# Patient Record
Sex: FEMALE | Race: WHITE | NOT HISPANIC OR LATINO | Employment: OTHER | URBAN - METROPOLITAN AREA
[De-identification: names, ages, dates, MRNs, and addresses within clinical notes are randomized per-mention and may not be internally consistent; named-entity substitution may affect disease eponyms.]

---

## 2021-06-19 ENCOUNTER — APPOINTMENT (OUTPATIENT)
Dept: GENERAL RADIOLOGY | Facility: HOSPITAL | Age: 86
End: 2021-06-19

## 2021-06-19 ENCOUNTER — HOSPITAL ENCOUNTER (INPATIENT)
Facility: HOSPITAL | Age: 86
LOS: 3 days | Discharge: HOME | End: 2021-06-22
Attending: INTERNAL MEDICINE | Admitting: INTERNAL MEDICINE

## 2021-06-19 DIAGNOSIS — I21.3 ST ELEVATION MYOCARDIAL INFARCTION (STEMI), UNSPECIFIED ARTERY (HCC): ICD-10-CM

## 2021-06-19 DIAGNOSIS — R06.00 DYSPNEA, UNSPECIFIED TYPE: Primary | ICD-10-CM

## 2021-06-19 LAB
ACT BLD: 142 SECONDS (ref 89–137)
ACT BLD: 153 SECONDS (ref 89–137)
ANION GAP SERPL CALCULATED.3IONS-SCNC: 14 MMOL/L (ref 5–15)
APTT PPP: 25.2 SECONDS (ref 24–31)
ARTERIAL PATENCY WRIST A: POSITIVE
ATMOSPHERIC PRESS: ABNORMAL MM[HG]
BASE EXCESS BLDA CALC-SCNC: -1.7 MMOL/L (ref 0–3)
BASOPHILS # BLD AUTO: 0 10*3/MM3 (ref 0–0.2)
BASOPHILS NFR BLD AUTO: 0.2 % (ref 0–1.5)
BDY SITE: ABNORMAL
BUN SERPL-MCNC: 14 MG/DL (ref 8–23)
BUN/CREAT SERPL: 15.2 (ref 7–25)
CALCIUM SPEC-SCNC: 9.1 MG/DL (ref 8.6–10.5)
CHLORIDE SERPL-SCNC: 99 MMOL/L (ref 98–107)
CK SERPL-CCNC: 169 U/L (ref 20–180)
CO2 BLDA-SCNC: 24 MMOL/L (ref 22–29)
CO2 SERPL-SCNC: 21 MMOL/L (ref 22–29)
CREAT SERPL-MCNC: 0.92 MG/DL (ref 0.57–1)
DEPRECATED RDW RBC AUTO: 43.8 FL (ref 37–54)
EOSINOPHIL # BLD AUTO: 0 10*3/MM3 (ref 0–0.4)
EOSINOPHIL NFR BLD AUTO: 0.1 % (ref 0.3–6.2)
ERYTHROCYTE [DISTWIDTH] IN BLOOD BY AUTOMATED COUNT: 13.4 % (ref 12.3–15.4)
GFR SERPL CREATININE-BSD FRML MDRD: 58 ML/MIN/1.73
GLUCOSE BLDC GLUCOMTR-MCNC: 142 MG/DL (ref 70–105)
GLUCOSE SERPL-MCNC: 181 MG/DL (ref 65–99)
HCO3 BLDA-SCNC: 22.8 MMOL/L (ref 21–28)
HCT VFR BLD AUTO: 40.3 % (ref 34–46.6)
HEMODILUTION: NO
HGB BLD-MCNC: 13.8 G/DL (ref 12–15.9)
HOLD SPECIMEN: NORMAL
INHALED O2 CONCENTRATION: <21 %
INR PPP: 1.07 (ref 0.93–1.1)
LYMPHOCYTES # BLD AUTO: 0.8 10*3/MM3 (ref 0.7–3.1)
LYMPHOCYTES NFR BLD AUTO: 6.3 % (ref 19.6–45.3)
MAGNESIUM SERPL-MCNC: 1.9 MG/DL (ref 1.6–2.4)
MCH RBC QN AUTO: 31.8 PG (ref 26.6–33)
MCHC RBC AUTO-ENTMCNC: 34.3 G/DL (ref 31.5–35.7)
MCV RBC AUTO: 92.8 FL (ref 79–97)
MODALITY: ABNORMAL
MONOCYTES # BLD AUTO: 1.7 10*3/MM3 (ref 0.1–0.9)
MONOCYTES NFR BLD AUTO: 13 % (ref 5–12)
NEUTROPHILS NFR BLD AUTO: 10.5 10*3/MM3 (ref 1.7–7)
NEUTROPHILS NFR BLD AUTO: 80.4 % (ref 42.7–76)
NRBC BLD AUTO-RTO: 0 /100 WBC (ref 0–0.2)
NT-PROBNP SERPL-MCNC: 6849 PG/ML (ref 0–1800)
PCO2 BLDA: 37.4 MM HG (ref 35–48)
PH BLDA: 7.39 PH UNITS (ref 7.35–7.45)
PLATELET # BLD AUTO: 263 10*3/MM3 (ref 140–450)
PMV BLD AUTO: 9.6 FL (ref 6–12)
PO2 BLDA: 58.7 MM HG (ref 83–108)
POTASSIUM SERPL-SCNC: 3.9 MMOL/L (ref 3.5–5.2)
PROTHROMBIN TIME: 11.8 SECONDS (ref 9.6–11.7)
RBC # BLD AUTO: 4.34 10*6/MM3 (ref 3.77–5.28)
SAO2 % BLDCOA: 90 % (ref 94–98)
SARS-COV-2 RNA PNL SPEC NAA+PROBE: NOT DETECTED
SODIUM SERPL-SCNC: 134 MMOL/L (ref 136–145)
TROPONIN T SERPL-MCNC: 0.19 NG/ML (ref 0–0.03)
WBC # BLD AUTO: 13.1 10*3/MM3 (ref 3.4–10.8)

## 2021-06-19 PROCEDURE — 25010000002 HEPARIN (PORCINE) PER 1000 UNITS

## 2021-06-19 PROCEDURE — 25010000002 ONDANSETRON PER 1 MG

## 2021-06-19 PROCEDURE — 93458 L HRT ARTERY/VENTRICLE ANGIO: CPT | Performed by: INTERNAL MEDICINE

## 2021-06-19 PROCEDURE — 80048 BASIC METABOLIC PNL TOTAL CA: CPT | Performed by: NURSE PRACTITIONER

## 2021-06-19 PROCEDURE — 84484 ASSAY OF TROPONIN QUANT: CPT | Performed by: NURSE PRACTITIONER

## 2021-06-19 PROCEDURE — 85610 PROTHROMBIN TIME: CPT | Performed by: NURSE PRACTITIONER

## 2021-06-19 PROCEDURE — C1894 INTRO/SHEATH, NON-LASER: HCPCS | Performed by: INTERNAL MEDICINE

## 2021-06-19 PROCEDURE — 85730 THROMBOPLASTIN TIME PARTIAL: CPT | Performed by: NURSE PRACTITIONER

## 2021-06-19 PROCEDURE — 25010000002 FENTANYL CITRATE (PF) 50 MCG/ML SOLUTION

## 2021-06-19 PROCEDURE — B2111ZZ FLUOROSCOPY OF MULTIPLE CORONARY ARTERIES USING LOW OSMOLAR CONTRAST: ICD-10-PCS | Performed by: INTERNAL MEDICINE

## 2021-06-19 PROCEDURE — 99285 EMERGENCY DEPT VISIT HI MDM: CPT

## 2021-06-19 PROCEDURE — 99152 MOD SED SAME PHYS/QHP 5/>YRS: CPT | Performed by: INTERNAL MEDICINE

## 2021-06-19 PROCEDURE — 85347 COAGULATION TIME ACTIVATED: CPT

## 2021-06-19 PROCEDURE — 25010000002 METOCLOPRAMIDE PER 10 MG: Performed by: NURSE PRACTITIONER

## 2021-06-19 PROCEDURE — 25010000002 HEPARIN (PORCINE) PER 1000 UNITS: Performed by: NURSE PRACTITIONER

## 2021-06-19 PROCEDURE — 82962 GLUCOSE BLOOD TEST: CPT

## 2021-06-19 PROCEDURE — 99223 1ST HOSP IP/OBS HIGH 75: CPT | Performed by: INTERNAL MEDICINE

## 2021-06-19 PROCEDURE — 83880 ASSAY OF NATRIURETIC PEPTIDE: CPT | Performed by: NURSE PRACTITIONER

## 2021-06-19 PROCEDURE — 82550 ASSAY OF CK (CPK): CPT | Performed by: NURSE PRACTITIONER

## 2021-06-19 PROCEDURE — 0 IOPAMIDOL PER 1 ML: Performed by: INTERNAL MEDICINE

## 2021-06-19 PROCEDURE — 85025 COMPLETE CBC W/AUTO DIFF WBC: CPT | Performed by: NURSE PRACTITIONER

## 2021-06-19 PROCEDURE — 4A023N7 MEASUREMENT OF CARDIAC SAMPLING AND PRESSURE, LEFT HEART, PERCUTANEOUS APPROACH: ICD-10-PCS | Performed by: INTERNAL MEDICINE

## 2021-06-19 PROCEDURE — 36600 WITHDRAWAL OF ARTERIAL BLOOD: CPT

## 2021-06-19 PROCEDURE — U0003 INFECTIOUS AGENT DETECTION BY NUCLEIC ACID (DNA OR RNA); SEVERE ACUTE RESPIRATORY SYNDROME CORONAVIRUS 2 (SARS-COV-2) (CORONAVIRUS DISEASE [COVID-19]), AMPLIFIED PROBE TECHNIQUE, MAKING USE OF HIGH THROUGHPUT TECHNOLOGIES AS DESCRIBED BY CMS-2020-01-R: HCPCS | Performed by: NURSE PRACTITIONER

## 2021-06-19 PROCEDURE — 25010000002 ONDANSETRON PER 1 MG: Performed by: NURSE PRACTITIONER

## 2021-06-19 PROCEDURE — 25010000002 FENTANYL CITRATE (PF) 50 MCG/ML SOLUTION: Performed by: NURSE PRACTITIONER

## 2021-06-19 PROCEDURE — 93005 ELECTROCARDIOGRAM TRACING: CPT

## 2021-06-19 PROCEDURE — 82803 BLOOD GASES ANY COMBINATION: CPT

## 2021-06-19 PROCEDURE — B2151ZZ FLUOROSCOPY OF LEFT HEART USING LOW OSMOLAR CONTRAST: ICD-10-PCS | Performed by: INTERNAL MEDICINE

## 2021-06-19 PROCEDURE — 25010000002 FENTANYL CITRATE (PF) 100 MCG/2ML SOLUTION: Performed by: INTERNAL MEDICINE

## 2021-06-19 PROCEDURE — 93005 ELECTROCARDIOGRAM TRACING: CPT | Performed by: NURSE PRACTITIONER

## 2021-06-19 PROCEDURE — 71045 X-RAY EXAM CHEST 1 VIEW: CPT

## 2021-06-19 PROCEDURE — 83735 ASSAY OF MAGNESIUM: CPT | Performed by: NURSE PRACTITIONER

## 2021-06-19 PROCEDURE — C1769 GUIDE WIRE: HCPCS | Performed by: INTERNAL MEDICINE

## 2021-06-19 RX ORDER — NITROGLYCERIN 20 MG/100ML
INJECTION INTRAVENOUS
Status: DISPENSED
Start: 2021-06-19 | End: 2021-06-19

## 2021-06-19 RX ORDER — METOCLOPRAMIDE HYDROCHLORIDE 5 MG/ML
5 INJECTION INTRAMUSCULAR; INTRAVENOUS ONCE
Status: COMPLETED | OUTPATIENT
Start: 2021-06-19 | End: 2021-06-19

## 2021-06-19 RX ORDER — NITROGLYCERIN 20 MG/100ML
INJECTION INTRAVENOUS
Status: COMPLETED | OUTPATIENT
Start: 2021-06-19 | End: 2021-06-19

## 2021-06-19 RX ORDER — MULTIPLE VITAMINS W/ MINERALS TAB 9MG-400MCG
1 TAB ORAL DAILY
COMMUNITY

## 2021-06-19 RX ORDER — FENTANYL CITRATE 50 UG/ML
INJECTION, SOLUTION INTRAMUSCULAR; INTRAVENOUS
Status: COMPLETED
Start: 2021-06-19 | End: 2021-06-19

## 2021-06-19 RX ORDER — SODIUM CHLORIDE 9 MG/ML
50 INJECTION, SOLUTION INTRAVENOUS CONTINUOUS
Status: DISCONTINUED | OUTPATIENT
Start: 2021-06-19 | End: 2021-06-20

## 2021-06-19 RX ORDER — SODIUM CHLORIDE 9 MG/ML
INJECTION, SOLUTION INTRAVENOUS CONTINUOUS PRN
Status: COMPLETED | OUTPATIENT
Start: 2021-06-19 | End: 2021-06-19

## 2021-06-19 RX ORDER — LIDOCAINE HYDROCHLORIDE 20 MG/ML
INJECTION, SOLUTION INFILTRATION; PERINEURAL AS NEEDED
Status: DISCONTINUED | OUTPATIENT
Start: 2021-06-19 | End: 2021-06-19 | Stop reason: HOSPADM

## 2021-06-19 RX ORDER — HEPARIN SODIUM 1000 [USP'U]/ML
INJECTION, SOLUTION INTRAVENOUS; SUBCUTANEOUS
Status: COMPLETED
Start: 2021-06-19 | End: 2021-06-19

## 2021-06-19 RX ORDER — SODIUM CHLORIDE 9 MG/ML
250 INJECTION, SOLUTION INTRAVENOUS ONCE AS NEEDED
Status: DISCONTINUED | OUTPATIENT
Start: 2021-06-19 | End: 2021-06-22 | Stop reason: HOSPADM

## 2021-06-19 RX ORDER — FENTANYL CITRATE 50 UG/ML
INJECTION, SOLUTION INTRAMUSCULAR; INTRAVENOUS AS NEEDED
Status: DISCONTINUED | OUTPATIENT
Start: 2021-06-19 | End: 2021-06-19 | Stop reason: HOSPADM

## 2021-06-19 RX ORDER — FENTANYL CITRATE 50 UG/ML
INJECTION, SOLUTION INTRAMUSCULAR; INTRAVENOUS
Status: COMPLETED | OUTPATIENT
Start: 2021-06-19 | End: 2021-06-19

## 2021-06-19 RX ORDER — SODIUM CHLORIDE 0.9 % (FLUSH) 0.9 %
10 SYRINGE (ML) INJECTION AS NEEDED
Status: DISCONTINUED | OUTPATIENT
Start: 2021-06-19 | End: 2021-06-22 | Stop reason: HOSPADM

## 2021-06-19 RX ORDER — EPTIFIBATIDE 0.75 MG/ML
INJECTION, SOLUTION INTRAVENOUS
Status: DISCONTINUED
Start: 2021-06-19 | End: 2021-06-19 | Stop reason: WASHOUT

## 2021-06-19 RX ORDER — LISINOPRIL 40 MG/1
40 TABLET ORAL DAILY
COMMUNITY
End: 2022-12-02 | Stop reason: SDUPTHER

## 2021-06-19 RX ORDER — ASPIRIN 81 MG/1
TABLET, CHEWABLE ORAL
Status: COMPLETED
Start: 2021-06-19 | End: 2021-06-19

## 2021-06-19 RX ORDER — ASPIRIN 81 MG/1
81 TABLET, CHEWABLE ORAL DAILY
Status: DISCONTINUED | OUTPATIENT
Start: 2021-06-19 | End: 2021-06-22 | Stop reason: HOSPADM

## 2021-06-19 RX ORDER — ASPIRIN 81 MG/1
81 TABLET, CHEWABLE ORAL DAILY
COMMUNITY

## 2021-06-19 RX ORDER — ASPIRIN 81 MG/1
TABLET, CHEWABLE ORAL
Status: COMPLETED | OUTPATIENT
Start: 2021-06-19 | End: 2021-06-19

## 2021-06-19 RX ORDER — ACETAMINOPHEN 325 MG/1
650 TABLET ORAL EVERY 4 HOURS PRN
Status: DISCONTINUED | OUTPATIENT
Start: 2021-06-19 | End: 2021-06-22 | Stop reason: HOSPADM

## 2021-06-19 RX ORDER — AMLODIPINE BESYLATE 2.5 MG/1
2.5 TABLET ORAL DAILY
COMMUNITY
End: 2021-06-19

## 2021-06-19 RX ORDER — OXYBUTYNIN CHLORIDE 5 MG/1
5 TABLET ORAL 2 TIMES DAILY
COMMUNITY

## 2021-06-19 RX ORDER — ONDANSETRON 2 MG/ML
INJECTION INTRAMUSCULAR; INTRAVENOUS
Status: COMPLETED | OUTPATIENT
Start: 2021-06-19 | End: 2021-06-19

## 2021-06-19 RX ORDER — ONDANSETRON 2 MG/ML
INJECTION INTRAMUSCULAR; INTRAVENOUS
Status: COMPLETED
Start: 2021-06-19 | End: 2021-06-19

## 2021-06-19 RX ORDER — HEPARIN SODIUM 5000 [USP'U]/ML
INJECTION, SOLUTION INTRAVENOUS; SUBCUTANEOUS
Status: COMPLETED | OUTPATIENT
Start: 2021-06-19 | End: 2021-06-19

## 2021-06-19 RX ORDER — LISINOPRIL 20 MG/1
40 TABLET ORAL DAILY
Status: DISCONTINUED | OUTPATIENT
Start: 2021-06-19 | End: 2021-06-22 | Stop reason: HOSPADM

## 2021-06-19 RX ADMIN — METOPROLOL TARTRATE 25 MG: 25 TABLET, FILM COATED ORAL at 15:22

## 2021-06-19 RX ADMIN — ASPIRIN: 81 TABLET, CHEWABLE ORAL at 12:23

## 2021-06-19 RX ADMIN — FENTANYL CITRATE: 50 INJECTION, SOLUTION INTRAMUSCULAR; INTRAVENOUS at 12:24

## 2021-06-19 RX ADMIN — ASPIRIN 324 MG: 81 TABLET, CHEWABLE ORAL at 10:49

## 2021-06-19 RX ADMIN — SODIUM CHLORIDE 75 ML/HR: 9 INJECTION, SOLUTION INTRAVENOUS at 12:35

## 2021-06-19 RX ADMIN — HEPARIN SODIUM 4000 UNITS: 5000 INJECTION INTRAVENOUS; SUBCUTANEOUS at 10:47

## 2021-06-19 RX ADMIN — NITROGLYCERIN 10 MCG/MIN: 20 INJECTION INTRAVENOUS at 10:46

## 2021-06-19 RX ADMIN — Medication 10 ML: at 20:41

## 2021-06-19 RX ADMIN — FENTANYL CITRATE 50 MCG: 50 INJECTION, SOLUTION INTRAMUSCULAR; INTRAVENOUS at 10:48

## 2021-06-19 RX ADMIN — METOPROLOL TARTRATE 12.5 MG: 25 TABLET, FILM COATED ORAL at 20:40

## 2021-06-19 RX ADMIN — ONDANSETRON: 2 INJECTION INTRAMUSCULAR; INTRAVENOUS at 12:25

## 2021-06-19 RX ADMIN — ONDANSETRON 4 MG: 2 INJECTION INTRAMUSCULAR; INTRAVENOUS at 10:48

## 2021-06-19 RX ADMIN — HEPARIN SODIUM: 1000 INJECTION INTRAVENOUS; SUBCUTANEOUS at 12:24

## 2021-06-19 RX ADMIN — METOCLOPRAMIDE 5 MG: 5 INJECTION, SOLUTION INTRAMUSCULAR; INTRAVENOUS at 20:41

## 2021-06-19 NOTE — NURSING NOTE
Pt arrived from cath lab via stretcher and RN transport x 2 Bedside report from Jacquelin URBINA. VSS, no gtts infusing, right femoral sheath in place and being transduced. Pt alert and oriented without complaints of chest pain but does feel slightly SOB which is much improved. Family updated.

## 2021-06-19 NOTE — H&P
PULMONARY/CRITICAL CARE HISTORY & PHYSICAL       PATIENT NAME:     Suzette Blandon  :     1934    MRN:     [id number]       ROOM:     [facility] /     PRIMARY CARE PHYSICIAN:  Bartolo Meng    SUBJECTIVE     CHIEF COMPLAINT: Shortness of air      HISTORY OF PRESENT ILLNESS:  The patient is an 86-year-old female who presented to the emergency department today for evaluation of shortness of air with progressive worsening over the past 6 days.  She reports that the shortness of air worsens with exertion and noted that it became increasingly worse when she walked in the heat.  She reports that her shortness of air is worse with exertion and somewhat relieved at rest.  She denies that she has had fever, chills, chest pain, peripheral edema, nausea, vomiting or abdominal pain.  She does report that she had episodic diarrhea approximately 6 days ago which spontaneously resolved.  She denies that she has had a cough or expectoration.  On evaluation in the emergency department room air saturation was noted to be 87% which responded appropriately with 2 L O2 to saturation 94%.  EKG revealed changes consistent with STEMI and cardiology was consulted.  The patient was taken urgently to the cardiac catheterization lab where she was noted to have nonobstructive coronary artery disease in the diagonal branch with no other abnormalities however she did have severe LV dysfunction consistent with Takotsubo cardiomyopathy.  The patient was placed on low-dose beta-blockers and ACE inhibitor and admitted to CVICU post procedure.    Pulmonary/Intensivist service was contacted for admission to ICU and further evaluation and treatment.      REVIEW OF SYSTEMS:  As above      ASSESSMENT & PLAN   Acute coronary syndrome  Takotsubo cardiomyopathy  Hypertension  Essential hyperlipidemia  GERD    Plan  O2 as needed to maintain a saturation of 92%, currently on 4 L  Continue aspirin  Continue lisinopril and  "beta-blocker    Echocardiogram pending  Bilateral lower extremity venous Dopplers pending      Code Status: Full code  VTE Prophylaxis: SCDs, early mobilization  PUD Prophylaxis: Not indicated, tolerating p.o. intake      HOSPITAL MEDICATIONS     SCHEDULED MEDICATIONS:  aspirin, 81 mg, Oral, Daily  lisinopril, 40 mg, Oral, Daily  metoprolol tartrate, 25 mg, Oral, Q12H  nitroglycerin, , ,          CONTINUOUS INFUSIONS:    sodium chloride, 75 mL/hr, Last Rate: 75 mL/hr (06/19/21 1235)         PRN MEDICATIONS:   •  acetaminophen  •  atropine  •  [COMPLETED] Insert peripheral IV **AND** sodium chloride  •  sodium chloride       OBJECTIVE     VITAL SIGNS:  /78   Pulse 72   Temp 98.2 °F (36.8 °C) (Oral)   Resp 18   Ht 162.6 cm (64\")   Wt 104 kg (229 lb 11.5 oz)   SpO2 93%   BMI 39.43 kg/m²     Wt Readings from Last 3 Encounters:   06/19/21 104 kg (229 lb 11.5 oz)       INTAKE/OUTPUT:  No intake or output data in the 24 hours ending 06/19/21 8859    PHYSICAL EXAM:   Constitutional:  Well developed, well nourished, no acute distress, non-toxic appearance   Eyes:  PERRL, conjunctiva normal, EOMI   HENT:  Atraumatic, external ears normal, nose normal. Neck-normal range of motion, no JVD, supple, trachea midline  Respiratory: clear and equal , non-labored respirations without accessory muscle use  Cardiovascular:  Normal rate, normal rhythm, no murmurs, no gallops, no rubs   GI:  Soft, nondistended, normal bowel sounds, nontender, no rebound, no guarding   :  deferred   Musculoskeletal:  No edema, no cyanosis or clubbing, no deformities  Integument:  Well hydrated, no rash   Neurologic:  Alert & oriented x 3, no lateralizing deficits  Psychiatric:  Speech and behavior appropriate       HISTORY     HISTORY:  Past Medical History:   Diagnosis Date   • Arthritis    • Coronary artery disease    • Elevated cholesterol    • GERD (gastroesophageal reflux disease)    • Hypertension      Past Surgical History: "   Procedure Laterality Date   • ABDOMINAL SURGERY     • CARDIAC CATHETERIZATION     • EYE SURGERY      CATARACT SURGERY   • HYSTERECTOMY     • JOINT REPLACEMENT      RIGHT HIP     Family History   Problem Relation Age of Onset   • Heart disease Mother    • COPD Father    • Heart disease Father    • Hypertension Father      Social History     Socioeconomic History   • Marital status:      Spouse name: Not on file   • Number of children: Not on file   • Years of education: Not on file   • Highest education level: Not on file   Tobacco Use   • Smoking status: Former Smoker   • Smokeless tobacco: Never Used   Vaping Use   • Vaping Use: Never used   Substance and Sexual Activity   • Alcohol use: Never   • Drug use: Never   • Sexual activity: Never        HOME MEDICATIONS:   Prior to Admission medications    Medication Sig Start Date End Date Taking? Authorizing Provider   aspirin 81 MG chewable tablet Chew 81 mg Daily.   Yes Joaquim Mcguire MD   lisinopril (PRINIVIL,ZESTRIL) 40 MG tablet Take 40 mg by mouth Daily.   Yes Joaquim Mcguire MD   Misc Natural Products (Osteo Bi-Flex Joint Shield) tablet Take 1 tablet by mouth 2 (two) times a day. Osteo Bi-Flex with Turmeric   Yes Joaquim Mcguire MD   multivitamin with minerals (Centrum Adults) tablet tablet Take 1 tablet by mouth Daily.   Yes Joaquim Mcguire MD   oxybutynin (DITROPAN) 5 MG tablet Take 5 mg by mouth 2 (Two) Times a Day.   Yes Joaquim Mcguire MD   amLODIPine (NORVASC) 2.5 MG tablet Take 2.5 mg by mouth Daily.  6/19/21 Yes Joaquim Mcguire MD       IMMUNIZATIONS:    There is no immunization history on file for this patient.     ALLERGIES:  Oxycodone, Penicillins, and Polyoxyethylene lauryl ether [sorbitan]      RESULTS     LABS:  Lab Results (last 24 hours)     Procedure Component Value Units Date/Time    COVID PRE-OP / PRE-PROCEDURE SCREENING ORDER (NO ISOLATION) - Swab, Nasopharynx [[id number]]  (Normal) Collected:  06/19/21 1036    Specimen: Swab from Nasopharynx Updated: 06/19/21 1154    Narrative:      The following orders were created for panel order COVID PRE-OP / PRE-PROCEDURE SCREENING ORDER (NO ISOLATION) - Swab, Nasopharynx.  Procedure                               Abnormality         Status                     ---------                               -----------         ------                     COVID-19,CEPHEID,COR/JEANE...[[id number]]  Normal              Final result                 Please view results for these tests on the individual orders.    COVID-19,CEPHEID,COR/JEANE/PAD/ZIA IN-HOUSE(OR EMERGENT/ADD-ON),NP SWAB IN TRANSPORT MEDIA 3-4 HR TAT, RT-PCR - Swab, Nasopharynx [[id number]]  (Normal) Collected: 06/19/21 1036    Specimen: Swab from Nasopharynx Updated: 06/19/21 1154     COVID19 Not Detected    Narrative:      Fact sheet for providers: https://www.fda.gov/media/102445/download     Fact sheet for patients: https://www.fda.gov/media/648381/download  Fact sheet for providers: https://www.fda.gov/media/494889/download     Fact sheet for patients: https://www.fda.gov/media/739364/download    CBC & Differential [[id number]]  (Abnormal) Collected: 06/19/21 1053    Specimen: Blood Updated: 06/19/21 1056    Narrative:      The following orders were created for panel order CBC & Differential.  Procedure                               Abnormality         Status                     ---------                               -----------         ------                     CBC Auto Differential[[id number]]        Abnormal            Final result                 Please view results for these tests on the individual orders.    Basic Metabolic Panel [[id number]]  (Abnormal) Collected: 06/19/21 1053    Specimen: Blood Updated: 06/19/21 1118     Glucose 181 mg/dL      BUN 14 mg/dL      Creatinine 0.92 mg/dL      Sodium 134 mmol/L      Potassium 3.9 mmol/L      Chloride 99 mmol/L      CO2 21.0 mmol/L      Calcium 9.1 mg/dL      eGFR Non   Amer 58 mL/min/1.73      BUN/Creatinine Ratio 15.2     Anion Gap 14.0 mmol/L     Narrative:      GFR Normal >60  Chronic Kidney Disease <60  Kidney Failure <15      Protime-INR [[id number]]  (Abnormal) Collected: 06/19/21 1053    Specimen: Blood Updated: 06/19/21 1106     Protime 11.8 Seconds      INR 1.07    aPTT [[id number]]  (Normal) Collected: 06/19/21 1053    Specimen: Blood Updated: 06/19/21 1106     PTT 25.2 seconds     Troponin [[id number]]  (Abnormal) Collected: 06/19/21 1053    Specimen: Blood Updated: 06/19/21 1120     Troponin T 0.188 ng/mL     Narrative:      Troponin T Reference Range:  <= 0.03 ng/mL-   Negative for AMI  >0.03 ng/mL-     Abnormal for myocardial necrosis.  Clinicians would have to utilize clinical acumen, EKG, Troponin and serial changes to determine if it is an Acute Myocardial Infarction or myocardial injury due to an underlying chronic condition.       Results may be falsely decreased if patient taking Biotin.      BNP [[id number]]  (Abnormal) Collected: 06/19/21 1053    Specimen: Blood Updated: 06/19/21 1116     proBNP 6,849.0 pg/mL     Narrative:      Among patients with dyspnea, NT-proBNP is highly sensitive for the detection of acute congestive heart failure. In addition NT-proBNP of <300 pg/ml effectively rules out acute congestive heart failure with 99% negative predictive value.    Results may be falsely decreased if patient taking Biotin.      CBC Auto Differential [[id number]]  (Abnormal) Collected: 06/19/21 1053    Specimen: Blood Updated: 06/19/21 1056     WBC 13.10 10*3/mm3      RBC 4.34 10*6/mm3      Hemoglobin 13.8 g/dL      Hematocrit 40.3 %      MCV 92.8 fL      MCH 31.8 pg      MCHC 34.3 g/dL      RDW 13.4 %      RDW-SD 43.8 fl      MPV 9.6 fL      Platelets 263 10*3/mm3      Neutrophil % 80.4 %      Lymphocyte % 6.3 %      Monocyte % 13.0 %      Eosinophil % 0.1 %      Basophil % 0.2 %      Neutrophils, Absolute 10.50 10*3/mm3      Lymphocytes, Absolute 0.80  10*3/mm3      Monocytes, Absolute 1.70 10*3/mm3      Eosinophils, Absolute 0.00 10*3/mm3      Basophils, Absolute 0.00 10*3/mm3      nRBC 0.0 /100 WBC     Magnesium [[id number]]  (Normal) Collected: 06/19/21 1053    Specimen: Blood Updated: 06/19/21 1118     Magnesium 1.9 mg/dL     CK [[id number]]  (Normal) Collected: 06/19/21 1053    Specimen: Blood Updated: 06/19/21 1118     Creatine Kinase 169 U/L     Blood Gas, Arterial - [[id number]]  (Abnormal) Collected: 06/19/21 1055    Specimen: Arterial Blood Updated: 06/19/21 1059     Site Right Radial     Neri's Test Positive     pH, Arterial 7.394 pH units      pCO2, Arterial 37.4 mm Hg      pO2, Arterial 58.7 mm Hg      HCO3, Arterial 22.8 mmol/L      Base Excess, Arterial -1.7 mmol/L      Comment: Serial Number: [id number]:  [id number]        O2 Saturation, Arterial 90.0 %      CO2 Content 24.0 mmol/L      Barometric Pressure for Blood Gas --     Comment: N/A        Modality Cannula     FIO2 <21 %      Hemodilution No    POC Activated Clotting Time [[id number]]  (Abnormal) Collected: 06/19/21 1143    Specimen: Arterial Blood Updated: 06/19/21 1312     Activated Clotting Time  153 Seconds      Comment: Serial Number: [id number]:  [id number]       POC Glucose Once [[id number]]  (Abnormal) Collected: 06/19/21 1222    Specimen: Blood Updated: 06/19/21 1223     Glucose 142 mg/dL      Comment: Serial Number: [id number]:  [id number]       POC Activated Clotting Time [[id number]]  (Abnormal) Collected: 06/19/21 1236    Specimen: Arterial Blood Updated: 06/19/21 1241     Activated Clotting Time  142 Seconds      Comment: Serial Number: [id number]:  [id number]               MICRO:  Microbiology Results (last 10 days)     Procedure Component Value - Date/Time    COVID PRE-OP / PRE-PROCEDURE SCREENING ORDER (NO ISOLATION) - Swab, Nasopharynx [[id number]]  (Normal) Collected: 06/19/21 1036    Lab Status: Final result Specimen: Swab from Nasopharynx Updated: 06/19/21 1154     Narrative:      The following orders were created for panel order COVID PRE-OP / PRE-PROCEDURE SCREENING ORDER (NO ISOLATION) - Swab, Nasopharynx.  Procedure                               Abnormality         Status                     ---------                               -----------         ------                     COVID-19,CEPHEID,COR/JEANE...[[id number]]  Normal              Final result                 Please view results for these tests on the individual orders.    COVID-19,CEPHEID,COR/JEANE/PAD/ZIA IN-HOUSE(OR EMERGENT/ADD-ON),NP SWAB IN TRANSPORT MEDIA 3-4 HR TAT, RT-PCR - Swab, Nasopharynx [[id number]]  (Normal) Collected: 06/19/21 1036    Lab Status: Final result Specimen: Swab from Nasopharynx Updated: 06/19/21 1154     COVID19 Not Detected    Narrative:      Fact sheet for providers: https://www.fda.gov/media/578520/download     Fact sheet for patients: https://www.fda.gov/media/728772/download  Fact sheet for providers: https://www.fda.gov/media/997014/download     Fact sheet for patients: https://www.fda.gov/media/180511/download            RADIOLOGY STUDIES:  Imaging Results (Last 72 Hours)     ** No results found for the last 72 hours. **                  ECHOCARDIOGRAM:         I reviewed the patient's new clinical results.        Appropriate PPE worn during assessment of patient per established guidelines.      Electronically signed by SANDRA Gamboa, 06/19/21, 3:49 PM EDT.

## 2021-06-19 NOTE — ED PROVIDER NOTES
Subjective   Chief complaint: soa      Context: Patient is an 86-year-old female who comes in ambulatory by private vehicle with family with complaints of shortness of breath that has been increasing for the last 6 days.  She states she had exerted herself in the heat 6 days ago when her car would not start and she had to walk.  She states she did have some diarrhea 6 days ago but none since then.  She denies any productive cough or fever.  She has not been Covid vaccinated but denies any recent travel or ill contacts.  She has never had a heart cath or stress test and does not currently see a cardiologist or lung doctor.  She has no history of sleep apnea.  She denies any IVDA or smoking. no History of CHF- she reports her only history is hypertension and she has been compliant with her lisinopril and Norvasc.  No swelling to her legs or feet recent trauma surgery immobilization prior history of DVT PE or exogenous hormone use.      Duration: 6 days, worse today    Timing: Worse with exertion    Severity: Mild to moderate      Associated symptoms:worse with exertion          PCP:           Review of Systems   Constitutional: Negative for fever.   HENT: Negative.    Eyes: Negative for visual disturbance.   Respiratory: Positive for shortness of breath.    Cardiovascular: Negative for palpitations and leg swelling.   Gastrointestinal: Positive for diarrhea and nausea.   Genitourinary: Negative.    Musculoskeletal: Negative.    Skin: Negative.    Allergic/Immunologic: Negative for immunocompromised state.   Neurological: Negative.    Hematological: Does not bruise/bleed easily.   Psychiatric/Behavioral: Negative for confusion.       Past Medical History:   Diagnosis Date   • Hypertension        No Known Allergies    No past surgical history on file.    No family history on file.             Objective   Physical Exam     Vital signs and triage nurse note reviewed.   Constitutional: Awake, alert; obese.  Family at  bedside  HEENT: Normocephalic, atraumatic; pupils are PERRL with intact EOM; oropharynx is pink and moist without exudate or erythema.   Neck: Supple, full range of motion without pain;    Cardiovascular: tachycardic Regular rate and rhythm, normal S1-S2.   Pulmonary: Respiratory effort regular nonlabored, breath sounds diminished BLL   Abdomen: Soft, nontender nondistended with normoactive bowel sounds; no rebound or guarding.   Musculoskeletal: Independent range of motion of all extremities with no palpable tenderness or edema.   Neuro: Alert oriented x3, speech is clear and appropriate, GCS 15   Skin:  Fleshtone warm, dry, intact; no erythematous or petechial rash or lesion       ECG 12 Lead      Date/Time: 6/19/2021 10:37 AM  Performed by: Shani Galicia APRN  Authorized by: Shani Galicia APRN   Interpreted by physician (karissa)  Previous ECG: no previous ECG available  Rhythm: sinus tachycardia  BPM: 104  Conduction: 1st degree  Clinical impression: myocardial infarction                 ED Course  ED Course as of Jun 19 1102   Sat Jun 19, 2021   1045 Dr maldonado at bedside      [JW]      ED Course User Index  [JW] Shani Galicia APRN           Labs Reviewed   CBC WITH AUTO DIFFERENTIAL - Abnormal; Notable for the following components:       Result Value    WBC 13.10 (*)     Neutrophil % 80.4 (*)     Lymphocyte % 6.3 (*)     Monocyte % 13.0 (*)     Eosinophil % 0.1 (*)     Neutrophils, Absolute 10.50 (*)     Monocytes, Absolute 1.70 (*)     All other components within normal limits   BLOOD GAS, ARTERIAL - Abnormal; Notable for the following components:    pO2, Arterial 58.7 (*)     Base Excess, Arterial -1.7 (*)     O2 Saturation, Arterial 90.0 (*)     All other components within normal limits   COVID PRE-OP / PRE-PROCEDURE SCREENING ORDER (NO ISOLATION)    Narrative:     The following orders were created for panel order COVID PRE-OP / PRE-PROCEDURE SCREENING ORDER (NO ISOLATION) - Swab,  Nasopharynx.  Procedure                               Abnormality         Status                     ---------                               -----------         ------                     COVID-19,CEPHEID,COR/JEANE...[[id number]]                      In process                   Please view results for these tests on the individual orders.   COVID-19,CEPHEID,COR/JEANE/PAD/ZIA IN-HOUSE(OR EMERGENT/ADD-ON),NP SWAB IN TRANSPORT MEDIA 3-4 HR TAT, RT-PCR   BLOOD GAS, ARTERIAL   BASIC METABOLIC PANEL   PROTIME-INR   APTT   TROPONIN (IN-HOUSE)   BNP (IN-HOUSE)   MAGNESIUM   CK   CBC AND DIFFERENTIAL    Narrative:     The following orders were created for panel order CBC & Differential.  Procedure                               Abnormality         Status                     ---------                               -----------         ------                     CBC Auto Differential[[id number]]        Abnormal            Final result                 Please view results for these tests on the individual orders.   EXTRA TUBES    Narrative:     The following orders were created for panel order Extra Tubes.  Procedure                               Abnormality         Status                     ---------                               -----------         ------                     Gold Top - SST[[id number]]                                   In process                   Please view results for these tests on the individual orders.   GOLD TOP - SST     Medications   nitroglycerin (TRIDIL) 200-5 MCG/ML-% infusion  - ADS Override Pull (has no administration in time range)   fentaNYL citrate (PF) (SUBLIMAZE) 50 mcg/mL injection  - ADS Override Pull (has no administration in time range)   heparin (porcine) 1000 UNIT/ML injection  - ADS Override Pull (has no administration in time range)   aspirin 81 MG chewable tablet  - ADS Override Pull (has no administration in time range)   ondansetron (ZOFRAN) 4 MG/2ML injection  - ADS Override Pull (has no  administration in time range)   sodium chloride 0.9 % flush 10 mL (has no administration in time range)   nitroglycerin (TRIDIL) 200 mcg/ml infusion (10 mcg/min Intravenous New Bag 6/19/21 1046)   heparin (porcine) 5000 UNIT/ML injection (4,000 Units Intravenous Given 6/19/21 1047)   fentaNYL citrate (PF) (SUBLIMAZE) injection (50 mcg Intravenous Given 6/19/21 1048)   ondansetron (ZOFRAN) injection (4 mg Intravenous Given 6/19/21 1048)   aspirin chewable tablet (324 mg Oral Given 6/19/21 1049)     No radiology results for the last day  Prior to Admission medications    Not on File                                     MDM  Number of Diagnoses or Management Options  Dyspnea, unspecified type  ST elevation myocardial infarction (STEMI), unspecified artery (CMS/HCC)  Diagnosis management comments: Chart review: no prior ekg for comparison      Comorbidities:  has a past medical history of Hypertension.  Differentials: stemi nonstemi chf heat exhaustion  chf cardiomyopathy infection not all inclusive of differentials considered  Discussion with provider: joel  Radiology interpretation:  pending on admission  Lab interpretation: pending on admission    Appropriate PPE worn during exam.  Patient's room air oxygenation saturation noted to be 87 was placed on 2 L nasal cannula and immediately improved to 94%.  Was initially entered wrong in registration triage and no initial EKG comparison was available, as registration was fixed there remains no comparison EKG.  Discussed with Dr. Funez and Dr. Pace. stemi orders placed.   On reexam patient's blood pressure has improved to 146/77 and HR 87 and she remains in stable condition    i discussed findings with patient and family who voices understanding of admission for cath lab with possible intervention             Final diagnoses:   Dyspnea, unspecified type   ST elevation myocardial infarction (STEMI), unspecified artery (CMS/HCC)       ED Disposition  ED Disposition     ED  Disposition Condition Comment    Decision to Admit            No follow-up provider specified.       Medication List      No changes were made to your prescriptions during this visit.          Shani Galicia, APRN  06/19/21 6548

## 2021-06-19 NOTE — PLAN OF CARE
Problem: Adult Inpatient Plan of Care  Goal: Plan of Care Review  Outcome: Ongoing, Progressing  Goal: Patient-Specific Goal (Individualized)  Outcome: Ongoing, Progressing  Goal: Absence of Hospital-Acquired Illness or Injury  Outcome: Ongoing, Progressing  Intervention: Identify and Manage Fall Risk  Recent Flowsheet Documentation  Taken 6/19/2021 1230 by Loly Salmno RN  Safety Promotion/Fall Prevention:   safety round/check completed   fall prevention program maintained   gait belt  Intervention: Prevent Skin Injury  Recent Flowsheet Documentation  Taken 6/19/2021 1230 by Loly Salmon RN  Skin Protection:   pulse oximeter probe site changed   silicone foam dressing in place   incontinence pads utilized   skin-to-skin areas padded   skin-to-device areas padded  Goal: Optimal Comfort and Wellbeing  Outcome: Ongoing, Progressing  Intervention: Provide Person-Centered Care  Recent Flowsheet Documentation  Taken 6/19/2021 1230 by Loly Salmon RN  Trust Relationship/Rapport:   care explained   emotional support provided   choices provided  Goal: Readiness for Transition of Care  Outcome: Ongoing, Progressing  Intervention: Mutually Develop Transition Plan  Recent Flowsheet Documentation  Taken 6/19/2021 1231 by Loly Salmon RN  Transportation Anticipated: car, drives self  Patient/Family Anticipated Services at Transition: none  Patient/Family Anticipates Transition to: home with family  Taken 6/19/2021 1229 by Loly Salmon RN  Equipment Currently Used at Home:   none   cane, straight   walker, rolling     Problem: Fall Injury Risk  Goal: Absence of Fall and Fall-Related Injury  Outcome: Ongoing, Progressing  Intervention: Identify and Manage Contributors to Fall Injury Risk  Recent Flowsheet Documentation  Taken 6/19/2021 1230 by Loly Salmon RN  Medication Review/Management: medications reviewed  Intervention: Promote Injury-Free Environment  Recent Flowsheet Documentation  Taken 6/19/2021  1230 by Loly Salmon RN  Safety Promotion/Fall Prevention:   safety round/check completed   fall prevention program maintained   gait belt     Problem: Chest Pain  Goal: Resolution of Chest Pain Symptoms  Outcome: Ongoing, Progressing     Problem: Adjustment to Illness (Acute Coronary Syndrome)  Goal: Optimal Adaptation to Illness  Outcome: Ongoing, Progressing  Intervention: Support Adjustment to Life-Changing Event  Recent Flowsheet Documentation  Taken 6/19/2021 1230 by Loly Salmon RN  Supportive Measures:   active listening utilized   self-care encouraged     Problem: Arrhythmia/Dysrhythmia (Acute Coronary Syndrome)  Goal: Normalized Cardiac Rhythm  Outcome: Ongoing, Progressing     Problem: Cardiac-Related Pain (Acute Coronary Syndrome)  Goal: Absence of Cardiac-Related Pain  Outcome: Ongoing, Progressing     Problem: Hemodynamic Instability (Acute Coronary Syndrome)  Goal: Effective Cardiac Pump Function  Outcome: Ongoing, Progressing  Intervention: Optimize Cardiac Function and Blood Flow  Recent Flowsheet Documentation  Taken 6/19/2021 1230 by Loly Salmon RN  Fluid/Electrolyte Management: fluids adjusted     Problem: Tissue Perfusion (Acute Coronary Syndrome)  Goal: Adequate Tissue Perfusion  Outcome: Ongoing, Progressing  Intervention: Optimize Cardiac Tissue Perfusion  Recent Flowsheet Documentation  Taken 6/19/2021 1230 by Loly Salmon RN  Activity Management: (6 hour bedrest post sheath pull) bedrest  Airway/Ventilation Management:   airway patency maintained   pulmonary hygiene promoted  Environmental Support: calm environment promoted   Goal Outcome Evaluation:   Pt arrived to unit post heart-cath after arriving to ER with diagnosis of STEMI. Femoral sheath pulled at bedside with 20 minutes pressure held, no hematoma, no loss of pulse or signs of distress after or during sheath pull. Head of bed elevated at 2 pm. Bedrest until 730. VSS, continue to observe

## 2021-06-19 NOTE — CONSULTS
Referring Provider: Intensivist  Reason for Consultation: Acute coronary syndrome    Patient Care Team:  Bartolo Meng as PCP - General (Family Medicine)    Chief complaint chest pain and shortness of breath    Subjective .     History of present illness:  Suzette Blandon is a 86 y.o. female with history of hypertension hyperlipidemia presented to the hospital complains of chest pain and shortness of breath for the last few days.  Patient states that she is under a lot of stress with her 's health and has been here to see her while she had the symptoms.  Chest pain is like heaviness in the middle of the chest pressure with shortness of breath.  No complaints of any PND orthopnea.  No palpitation dizziness syncope.  No swelling of the feet.  Has been taking her medicines regularly for her blood pressure.  She does not smoke.  She is quite active.  In the ER patient was noted to have ST segment elevation in anterolateral leads and hence she was taken to cardiac catheterization laboratory.  Patient was placed on aspirin heparin in the ER.    Review of Systems   Constitutional: Negative for fever and malaise/fatigue.   HENT: Negative for ear pain and nosebleeds.    Eyes: Negative for blurred vision and double vision.   Cardiovascular: Positive for chest pain. Negative for dyspnea on exertion and palpitations.   Respiratory: Positive for shortness of breath. Negative for cough.    Skin: Negative for rash.   Musculoskeletal: Negative for joint pain.   Gastrointestinal: Negative for abdominal pain, nausea and vomiting.   Neurological: Negative for focal weakness and headaches.   Psychiatric/Behavioral: Negative for depression. The patient is not nervous/anxious.    All other systems reviewed and are negative.      History  Past Medical History:   Diagnosis Date   • Hypertension        History reviewed. No pertinent surgical history.    History reviewed. No pertinent family history.    Social History     Tobacco Use  "  • Smoking status: Former Smoker   • Smokeless tobacco: Never Used   Substance Use Topics   • Alcohol use: Never   • Drug use: Never        Medications Prior to Admission   Medication Sig Dispense Refill Last Dose   • aspirin 81 MG chewable tablet Chew 81 mg Daily.      • lisinopril (PRINIVIL,ZESTRIL) 40 MG tablet Take 40 mg by mouth Daily.      • Misc Natural Products (Osteo Bi-Flex Joint Shield) tablet Take 1 tablet by mouth 2 (two) times a day. Osteo Bi-Flex with Turmeric      • multivitamin with minerals (Centrum Adults) tablet tablet Take 1 tablet by mouth Daily.      • oxybutynin (DITROPAN) 5 MG tablet Take 5 mg by mouth 2 (Two) Times a Day.            Oxycodone, Penicillins, and Polyoxyethylene lauryl ether [sorbitan]    Scheduled Meds:aspirin, 81 mg, Oral, Daily  lisinopril, 40 mg, Oral, Daily  metoprolol tartrate, 25 mg, Oral, Q12H  nitroglycerin, , ,       Continuous Infusions:sodium chloride, 75 mL/hr, Last Rate: 75 mL/hr (06/19/21 1235)      PRN Meds:.•  acetaminophen  •  atropine  •  [COMPLETED] Insert peripheral IV **AND** sodium chloride  •  sodium chloride    Objective     VITAL SIGNS  Vitals:    06/19/21 1110 06/19/21 1115 06/19/21 1128 06/19/21 1215   BP:    111/62   BP Location:    Left arm   Patient Position:    Lying   Pulse: 91 90  80   Resp:    18   Temp:    98.2 °F (36.8 °C)   TempSrc:    Oral   SpO2: 95% 95% 93% 94%   Weight:    104 kg (229 lb 11.5 oz)   Height:    162.6 cm (64\")       Flowsheet Rows      First Filed Value   Admission Height  157.5 cm (62\") Documented at 06/19/2021 1027   Admission Weight  102 kg (225 lb) Documented at 06/19/2021 1027           TELEMETRY: Sinus rhythm with nonspecific ST segment abnormality    Physical Exam:  Constitutional:       Appearance: Well-developed.   Eyes:      General: No scleral icterus.     Conjunctiva/sclera: Conjunctivae normal.      Pupils: Pupils are equal, round, and reactive to light.   HENT:      Head: Normocephalic and atraumatic. "   Neck:      Vascular: No carotid bruit or JVD.   Pulmonary:      Effort: Pulmonary effort is normal.      Breath sounds: Normal breath sounds. No wheezing. No rales.   Cardiovascular:      Normal rate. Regular rhythm.   Pulses:     Intact distal pulses.   Abdominal:      General: Bowel sounds are normal.      Palpations: Abdomen is soft.   Musculoskeletal: Normal range of motion.      Cervical back: Normal range of motion and neck supple. Skin:     General: Skin is warm and dry.      Findings: No rash.   Neurological:      Mental Status: Alert.      Comments: No focal deficits          Results Review:   I reviewed the patient's new clinical results.  Lab Results (last 24 hours)     Procedure Component Value Units Date/Time    POC Activated Clotting Time [[id number]]  (Abnormal) Collected: 06/19/21 1143    Specimen: Arterial Blood Updated: 06/19/21 1312     Activated Clotting Time  153 Seconds      Comment: Serial Number: [id number]:  [id number]       POC Activated Clotting Time [[id number]]  (Abnormal) Collected: 06/19/21 1236    Specimen: Arterial Blood Updated: 06/19/21 1241     Activated Clotting Time  142 Seconds      Comment: Serial Number: [id number]:  [id number]       POC Glucose Once [[id number]]  (Abnormal) Collected: 06/19/21 1222    Specimen: Blood Updated: 06/19/21 1223     Glucose 142 mg/dL      Comment: Serial Number: [id number]:  [id number]       Extra Tubes [[id number]] Collected: 06/19/21 1053    Specimen: Blood, Venous Line Updated: 06/19/21 1200    Narrative:      The following orders were created for panel order Extra Tubes.  Procedure                               Abnormality         Status                     ---------                               -----------         ------                     [facility][[id number]]                                   Final result                 Please view results for these tests on the individual orders.    [facility] [[id number]] Collected: 06/19/21  1053    Specimen: Blood Updated: 06/19/21 1200     Extra Tube Hold for add-ons.     Comment: Auto resulted.       COVID PRE-OP / PRE-PROCEDURE SCREENING ORDER (NO ISOLATION) - Swab, Nasopharynx [[id number]]  (Normal) Collected: 06/19/21 1036    Specimen: Swab from Nasopharynx Updated: 06/19/21 1154    Narrative:      The following orders were created for panel order COVID PRE-OP / PRE-PROCEDURE SCREENING ORDER (NO ISOLATION) - Swab, Nasopharynx.  Procedure                               Abnormality         Status                     ---------                               -----------         ------                     COVID-19,CEPHEID,COR/JEANE...[[id number]]  Normal              Final result                 Please view results for these tests on the individual orders.    COVID-19,CEPHEID,COR/JEANE/PAD/ZIA IN-HOUSE(OR EMERGENT/ADD-ON),NP SWAB IN TRANSPORT MEDIA 3-4 HR TAT, RT-PCR - Swab, Nasopharynx [[id number]]  (Normal) Collected: 06/19/21 1036    Specimen: Swab from Nasopharynx Updated: 06/19/21 1154     COVID19 Not Detected    Narrative:      Fact sheet for providers: https://www.fda.gov/media/983749/download     Fact sheet for patients: https://www.fda.gov/media/704175/download  Fact sheet for providers: https://www.fda.gov/media/216602/download     Fact sheet for patients: https://www.fda.gov/media/021622/download    Troponin [[id number]]  (Abnormal) Collected: 06/19/21 1053    Specimen: Blood Updated: 06/19/21 1120     Troponin T 0.188 ng/mL     Narrative:      Troponin T Reference Range:  <= 0.03 ng/mL-   Negative for AMI  >0.03 ng/mL-     Abnormal for myocardial necrosis.  Clinicians would have to utilize clinical acumen, EKG, Troponin and serial changes to determine if it is an Acute Myocardial Infarction or myocardial injury due to an underlying chronic condition.       Results may be falsely decreased if patient taking Biotin.      Basic Metabolic Panel [[id number]]  (Abnormal) Collected: 06/19/21 1053     Specimen: Blood Updated: 06/19/21 1118     Glucose 181 mg/dL      BUN 14 mg/dL      Creatinine 0.92 mg/dL      Sodium 134 mmol/L      Potassium 3.9 mmol/L      Chloride 99 mmol/L      CO2 21.0 mmol/L      Calcium 9.1 mg/dL      eGFR Non African Amer 58 mL/min/1.73      BUN/Creatinine Ratio 15.2     Anion Gap 14.0 mmol/L     Narrative:      GFR Normal >60  Chronic Kidney Disease <60  Kidney Failure <15      Magnesium [[id number]]  (Normal) Collected: 06/19/21 1053    Specimen: Blood Updated: 06/19/21 1118     Magnesium 1.9 mg/dL     CK [[id number]]  (Normal) Collected: 06/19/21 1053    Specimen: Blood Updated: 06/19/21 1118     Creatine Kinase 169 U/L     BNP [[id number]]  (Abnormal) Collected: 06/19/21 1053    Specimen: Blood Updated: 06/19/21 1116     proBNP 6,849.0 pg/mL     Narrative:      Among patients with dyspnea, NT-proBNP is highly sensitive for the detection of acute congestive heart failure. In addition NT-proBNP of <300 pg/ml effectively rules out acute congestive heart failure with 99% negative predictive value.    Results may be falsely decreased if patient taking Biotin.      Protime-INR [[id number]]  (Abnormal) Collected: 06/19/21 1053    Specimen: Blood Updated: 06/19/21 1106     Protime 11.8 Seconds      INR 1.07    aPTT [[id number]]  (Normal) Collected: 06/19/21 1053    Specimen: Blood Updated: 06/19/21 1106     PTT 25.2 seconds     Blood Gas, Arterial - [[id number]]  (Abnormal) Collected: 06/19/21 1055    Specimen: Arterial Blood Updated: 06/19/21 1059     Site Right Radial     Neri's Test Positive     pH, Arterial 7.394 pH units      pCO2, Arterial 37.4 mm Hg      pO2, Arterial 58.7 mm Hg      HCO3, Arterial 22.8 mmol/L      Base Excess, Arterial -1.7 mmol/L      Comment: Serial Number: [id number]:  [id number]        O2 Saturation, Arterial 90.0 %      CO2 Content 24.0 mmol/L      Barometric Pressure for Blood Gas --     Comment: N/A        Modality Cannula     FIO2 <21 %      Hemodilution No    CBC &  Differential [[id number]]  (Abnormal) Collected: 06/19/21 1053    Specimen: Blood Updated: 06/19/21 1056    Narrative:      The following orders were created for panel order CBC & Differential.  Procedure                               Abnormality         Status                     ---------                               -----------         ------                     CBC Auto Differential[[id number]]        Abnormal            Final result                 Please view results for these tests on the individual orders.    CBC Auto Differential [[id number]]  (Abnormal) Collected: 06/19/21 1053    Specimen: Blood Updated: 06/19/21 1056     WBC 13.10 10*3/mm3      RBC 4.34 10*6/mm3      Hemoglobin 13.8 g/dL      Hematocrit 40.3 %      MCV 92.8 fL      MCH 31.8 pg      MCHC 34.3 g/dL      RDW 13.4 %      RDW-SD 43.8 fl      MPV 9.6 fL      Platelets 263 10*3/mm3      Neutrophil % 80.4 %      Lymphocyte % 6.3 %      Monocyte % 13.0 %      Eosinophil % 0.1 %      Basophil % 0.2 %      Neutrophils, Absolute 10.50 10*3/mm3      Lymphocytes, Absolute 0.80 10*3/mm3      Monocytes, Absolute 1.70 10*3/mm3      Eosinophils, Absolute 0.00 10*3/mm3      Basophils, Absolute 0.00 10*3/mm3      nRBC 0.0 /100 WBC           Imaging Results (Last 24 Hours)     ** No results found for the last 24 hours. **          EKG      I personally viewed and interpreted the patient's EKG/Telemetry data:    ECHOCARDIOGRAM:      STRESS MYOVIEW:    CARDIAC CATHETERIZATION:    OTHER:         Assessment/Plan     Acute coronary syndrome  Hypertension  Hyperlipidemia      Patient presented with chest pain or shortness of breath and had ST segment abnormalities in anterolateral leads consistent with acute MI  Patient has history of hypertension and takes medications  Patient also had elevated troponin consistent with non-STEMI  Patient will have an echocardiogram for LV function valve abnormalities  Patient was taken to cardiac catheterization laboratory and had  urgent cardiac authorization which showed nonobstructive disease in the diagonal branch with no other abnormalities but had severe LV dysfunction consistent with Takotsubo cardiomyopathy  Patient will be placed on low-dose beta-blockers and continued on the ACE inhibitor's at this time  We will watch her blood pressure heart rate and for any other arrhythmias  Patient's lipid levels will be checked     I discussed the patients findings and my recommendations with patient and nurse    Bennie Pace MD  06/19/21  13:25 EDT

## 2021-06-20 ENCOUNTER — APPOINTMENT (OUTPATIENT)
Dept: CARDIOLOGY | Facility: HOSPITAL | Age: 86
End: 2021-06-20

## 2021-06-20 ENCOUNTER — APPOINTMENT (OUTPATIENT)
Dept: GENERAL RADIOLOGY | Facility: HOSPITAL | Age: 86
End: 2021-06-20

## 2021-06-20 LAB
ANION GAP SERPL CALCULATED.3IONS-SCNC: 12 MMOL/L (ref 5–15)
BASOPHILS # BLD AUTO: 0 10*3/MM3 (ref 0–0.2)
BASOPHILS NFR BLD AUTO: 0.2 % (ref 0–1.5)
BH CV LOW VAS RIGHT GREATER SAPH BK VESSEL: 1
BH CV LOWER VASCULAR LEFT COMMON FEMORAL AUGMENT: NORMAL
BH CV LOWER VASCULAR LEFT COMMON FEMORAL COMPETENT: NORMAL
BH CV LOWER VASCULAR LEFT COMMON FEMORAL COMPRESS: NORMAL
BH CV LOWER VASCULAR LEFT COMMON FEMORAL PHASIC: NORMAL
BH CV LOWER VASCULAR LEFT COMMON FEMORAL SPONT: NORMAL
BH CV LOWER VASCULAR LEFT DISTAL FEMORAL COMPRESS: NORMAL
BH CV LOWER VASCULAR LEFT GASTRONEMIUS COMPRESS: NORMAL
BH CV LOWER VASCULAR LEFT GREATER SAPH AK COMPRESS: NORMAL
BH CV LOWER VASCULAR LEFT GREATER SAPH BK COMPRESS: NORMAL
BH CV LOWER VASCULAR LEFT LESSER SAPH COMPRESS: NORMAL
BH CV LOWER VASCULAR LEFT MID FEMORAL AUGMENT: NORMAL
BH CV LOWER VASCULAR LEFT MID FEMORAL COMPETENT: NORMAL
BH CV LOWER VASCULAR LEFT MID FEMORAL COMPRESS: NORMAL
BH CV LOWER VASCULAR LEFT MID FEMORAL PHASIC: NORMAL
BH CV LOWER VASCULAR LEFT MID FEMORAL SPONT: NORMAL
BH CV LOWER VASCULAR LEFT PERONEAL COMPRESS: NORMAL
BH CV LOWER VASCULAR LEFT POPLITEAL AUGMENT: NORMAL
BH CV LOWER VASCULAR LEFT POPLITEAL COMPETENT: NORMAL
BH CV LOWER VASCULAR LEFT POPLITEAL COMPRESS: NORMAL
BH CV LOWER VASCULAR LEFT POPLITEAL PHASIC: NORMAL
BH CV LOWER VASCULAR LEFT POPLITEAL SPONT: NORMAL
BH CV LOWER VASCULAR LEFT POSTERIOR TIBIAL COMPRESS: NORMAL
BH CV LOWER VASCULAR LEFT PROFUNDA FEMORAL COMPRESS: NORMAL
BH CV LOWER VASCULAR LEFT PROXIMAL FEMORAL COMPRESS: NORMAL
BH CV LOWER VASCULAR LEFT SAPHENOFEMORAL JUNCTION COMPRESS: NORMAL
BH CV LOWER VASCULAR RIGHT COMMON FEMORAL AUGMENT: NORMAL
BH CV LOWER VASCULAR RIGHT COMMON FEMORAL COMPETENT: NORMAL
BH CV LOWER VASCULAR RIGHT COMMON FEMORAL COMPRESS: NORMAL
BH CV LOWER VASCULAR RIGHT COMMON FEMORAL PHASIC: NORMAL
BH CV LOWER VASCULAR RIGHT COMMON FEMORAL SPONT: NORMAL
BH CV LOWER VASCULAR RIGHT DISTAL FEMORAL COMPRESS: NORMAL
BH CV LOWER VASCULAR RIGHT GASTRONEMIUS COMPRESS: NORMAL
BH CV LOWER VASCULAR RIGHT GREATER SAPH AK COMPRESS: NORMAL
BH CV LOWER VASCULAR RIGHT GREATER SAPH BK COMPRESS: NORMAL
BH CV LOWER VASCULAR RIGHT GREATER SAPH BK THROMBUS: NORMAL
BH CV LOWER VASCULAR RIGHT LESSER SAPH COMPRESS: NORMAL
BH CV LOWER VASCULAR RIGHT LESSER SAPH THROMBUS: NORMAL
BH CV LOWER VASCULAR RIGHT MID FEMORAL AUGMENT: NORMAL
BH CV LOWER VASCULAR RIGHT MID FEMORAL COMPETENT: NORMAL
BH CV LOWER VASCULAR RIGHT MID FEMORAL COMPRESS: NORMAL
BH CV LOWER VASCULAR RIGHT MID FEMORAL PHASIC: NORMAL
BH CV LOWER VASCULAR RIGHT MID FEMORAL SPONT: NORMAL
BH CV LOWER VASCULAR RIGHT PERONEAL COMPRESS: NORMAL
BH CV LOWER VASCULAR RIGHT POPLITEAL AUGMENT: NORMAL
BH CV LOWER VASCULAR RIGHT POPLITEAL COMPETENT: NORMAL
BH CV LOWER VASCULAR RIGHT POPLITEAL COMPRESS: NORMAL
BH CV LOWER VASCULAR RIGHT POPLITEAL PHASIC: NORMAL
BH CV LOWER VASCULAR RIGHT POPLITEAL SPONT: NORMAL
BH CV LOWER VASCULAR RIGHT POSTERIOR TIBIAL COMPRESS: NORMAL
BH CV LOWER VASCULAR RIGHT PROFUNDA FEMORAL COMPRESS: NORMAL
BH CV LOWER VASCULAR RIGHT PROXIMAL FEMORAL COMPRESS: NORMAL
BH CV LOWER VASCULAR RIGHT SAPHENOFEMORAL JUNCTION COMPRESS: NORMAL
BUN SERPL-MCNC: 17 MG/DL (ref 8–23)
BUN/CREAT SERPL: 16.7 (ref 7–25)
CALCIUM SPEC-SCNC: 8.5 MG/DL (ref 8.6–10.5)
CHLORIDE SERPL-SCNC: 100 MMOL/L (ref 98–107)
CO2 SERPL-SCNC: 25 MMOL/L (ref 22–29)
CREAT SERPL-MCNC: 1.02 MG/DL (ref 0.57–1)
DEPRECATED RDW RBC AUTO: 45.9 FL (ref 37–54)
EOSINOPHIL # BLD AUTO: 0.1 10*3/MM3 (ref 0–0.4)
EOSINOPHIL NFR BLD AUTO: 1.2 % (ref 0.3–6.2)
ERYTHROCYTE [DISTWIDTH] IN BLOOD BY AUTOMATED COUNT: 13.9 % (ref 12.3–15.4)
GFR SERPL CREATININE-BSD FRML MDRD: 51 ML/MIN/1.73
GLUCOSE BLDC GLUCOMTR-MCNC: 189 MG/DL (ref 70–105)
GLUCOSE SERPL-MCNC: 134 MG/DL (ref 65–99)
HCT VFR BLD AUTO: 41.2 % (ref 34–46.6)
HGB BLD-MCNC: 13.8 G/DL (ref 12–15.9)
LYMPHOCYTES # BLD AUTO: 1.5 10*3/MM3 (ref 0.7–3.1)
LYMPHOCYTES NFR BLD AUTO: 14.9 % (ref 19.6–45.3)
MAGNESIUM SERPL-MCNC: 2.2 MG/DL (ref 1.6–2.4)
MAXIMAL PREDICTED HEART RATE: 134 BPM
MCH RBC QN AUTO: 31.6 PG (ref 26.6–33)
MCHC RBC AUTO-ENTMCNC: 33.6 G/DL (ref 31.5–35.7)
MCV RBC AUTO: 93.8 FL (ref 79–97)
MONOCYTES # BLD AUTO: 1.4 10*3/MM3 (ref 0.1–0.9)
MONOCYTES NFR BLD AUTO: 14.2 % (ref 5–12)
NEUTROPHILS NFR BLD AUTO: 69.5 % (ref 42.7–76)
NEUTROPHILS NFR BLD AUTO: 7 10*3/MM3 (ref 1.7–7)
NRBC BLD AUTO-RTO: 0.1 /100 WBC (ref 0–0.2)
PLATELET # BLD AUTO: 244 10*3/MM3 (ref 140–450)
PMV BLD AUTO: 9.4 FL (ref 6–12)
POTASSIUM SERPL-SCNC: 4.1 MMOL/L (ref 3.5–5.2)
QT INTERVAL: 333 MS
QT INTERVAL: 450 MS
RBC # BLD AUTO: 4.39 10*6/MM3 (ref 3.77–5.28)
SODIUM SERPL-SCNC: 137 MMOL/L (ref 136–145)
STRESS TARGET HR: 114 BPM
WBC # BLD AUTO: 10.1 10*3/MM3 (ref 3.4–10.8)

## 2021-06-20 PROCEDURE — 93970 EXTREMITY STUDY: CPT

## 2021-06-20 PROCEDURE — 80048 BASIC METABOLIC PNL TOTAL CA: CPT | Performed by: INTERNAL MEDICINE

## 2021-06-20 PROCEDURE — 83735 ASSAY OF MAGNESIUM: CPT | Performed by: INTERNAL MEDICINE

## 2021-06-20 PROCEDURE — 93306 TTE W/DOPPLER COMPLETE: CPT | Performed by: INTERNAL MEDICINE

## 2021-06-20 PROCEDURE — 71045 X-RAY EXAM CHEST 1 VIEW: CPT

## 2021-06-20 PROCEDURE — 25010000002 FUROSEMIDE PER 20 MG: Performed by: NURSE PRACTITIONER

## 2021-06-20 PROCEDURE — 99221 1ST HOSP IP/OBS SF/LOW 40: CPT | Performed by: NURSE PRACTITIONER

## 2021-06-20 PROCEDURE — 82962 GLUCOSE BLOOD TEST: CPT

## 2021-06-20 PROCEDURE — 99232 SBSQ HOSP IP/OBS MODERATE 35: CPT | Performed by: INTERNAL MEDICINE

## 2021-06-20 PROCEDURE — 93306 TTE W/DOPPLER COMPLETE: CPT

## 2021-06-20 PROCEDURE — 85025 COMPLETE CBC W/AUTO DIFF WBC: CPT | Performed by: INTERNAL MEDICINE

## 2021-06-20 RX ORDER — OXYBUTYNIN CHLORIDE 5 MG/1
5 TABLET ORAL 2 TIMES DAILY
Status: DISCONTINUED | OUTPATIENT
Start: 2021-06-20 | End: 2021-06-22 | Stop reason: HOSPADM

## 2021-06-20 RX ORDER — PANTOPRAZOLE SODIUM 40 MG/1
40 TABLET, DELAYED RELEASE ORAL
Status: DISCONTINUED | OUTPATIENT
Start: 2021-06-20 | End: 2021-06-22 | Stop reason: HOSPADM

## 2021-06-20 RX ORDER — POLYETHYLENE GLYCOL 3350 17 G/17G
17 POWDER, FOR SOLUTION ORAL DAILY
Status: DISCONTINUED | OUTPATIENT
Start: 2021-06-20 | End: 2021-06-22 | Stop reason: HOSPADM

## 2021-06-20 RX ORDER — FUROSEMIDE 20 MG/1
20 TABLET ORAL EVERY 8 HOURS
Status: DISCONTINUED | OUTPATIENT
Start: 2021-06-20 | End: 2021-06-22 | Stop reason: HOSPADM

## 2021-06-20 RX ORDER — FUROSEMIDE 10 MG/ML
40 INJECTION INTRAMUSCULAR; INTRAVENOUS ONCE
Status: COMPLETED | OUTPATIENT
Start: 2021-06-20 | End: 2021-06-20

## 2021-06-20 RX ORDER — DOXYCYCLINE 100 MG/1
100 TABLET ORAL EVERY 12 HOURS SCHEDULED
Status: DISCONTINUED | OUTPATIENT
Start: 2021-06-20 | End: 2021-06-22 | Stop reason: HOSPADM

## 2021-06-20 RX ADMIN — Medication 10 ML: at 20:55

## 2021-06-20 RX ADMIN — FUROSEMIDE 20 MG: 20 TABLET ORAL at 20:54

## 2021-06-20 RX ADMIN — POLYETHYLENE GLYCOL 3350 17 G: 17 POWDER, FOR SOLUTION ORAL at 15:06

## 2021-06-20 RX ADMIN — FUROSEMIDE 40 MG: 10 INJECTION, SOLUTION INTRAMUSCULAR; INTRAVENOUS at 05:16

## 2021-06-20 RX ADMIN — LISINOPRIL 40 MG: 20 TABLET ORAL at 08:39

## 2021-06-20 RX ADMIN — DOXYCYCLINE 100 MG: 100 TABLET, FILM COATED ORAL at 20:55

## 2021-06-20 RX ADMIN — OXYBUTYNIN CHLORIDE 5 MG: 5 TABLET ORAL at 10:38

## 2021-06-20 RX ADMIN — METOPROLOL TARTRATE 25 MG: 25 TABLET, FILM COATED ORAL at 20:55

## 2021-06-20 RX ADMIN — ASPIRIN 81 MG CHEWABLE TABLET 81 MG: 81 TABLET CHEWABLE at 08:39

## 2021-06-20 RX ADMIN — PANTOPRAZOLE SODIUM 40 MG: 40 TABLET, DELAYED RELEASE ORAL at 05:17

## 2021-06-20 RX ADMIN — OXYBUTYNIN CHLORIDE 5 MG: 5 TABLET ORAL at 20:55

## 2021-06-20 RX ADMIN — FUROSEMIDE 20 MG: 20 TABLET ORAL at 14:00

## 2021-06-20 RX ADMIN — METOPROLOL TARTRATE 25 MG: 25 TABLET, FILM COATED ORAL at 08:39

## 2021-06-20 NOTE — PROGRESS NOTES
"Referring Provider: Intensivist    Reason for follow-up: Acute coronary syndrome and congestive heart failure     Patient Care Team:  Bartolo Meng as PCP - General (Family Medicine)    Subjective .  Patient is feeling better today with less shortness of breath    Objective  Lying in bed comfortably     Review of Systems   Constitutional: Negative for fever and malaise/fatigue.   HENT: Negative for ear pain and nosebleeds.    Eyes: Negative for blurred vision and double vision.   Cardiovascular: Negative for chest pain, dyspnea on exertion and palpitations.   Respiratory: Positive for shortness of breath. Negative for cough.    Skin: Negative for rash.   Musculoskeletal: Negative for joint pain.   Gastrointestinal: Negative for abdominal pain, nausea and vomiting.   Neurological: Negative for focal weakness and headaches.   Psychiatric/Behavioral: Negative for depression. The patient is not nervous/anxious.    All other systems reviewed and are negative.      Oxycodone, Penicillins, and Polyoxyethylene lauryl ether [sorbitan]    Scheduled Meds:aspirin, 81 mg, Oral, Daily  furosemide, 20 mg, Oral, Q8H  lisinopril, 40 mg, Oral, Daily  metoprolol tartrate, 25 mg, Oral, Q12H  oxybutynin, 5 mg, Oral, BID  pantoprazole, 40 mg, Oral, Q AM      Continuous Infusions:   PRN Meds:.•  acetaminophen  •  atropine  •  [COMPLETED] Insert peripheral IV **AND** sodium chloride  •  sodium chloride        VITAL SIGNS  Vitals:    06/20/21 0459 06/20/21 0505 06/20/21 0800 06/20/21 1023   BP:  104/78 113/68 104/78   Pulse:  53 61    Resp:       Temp: 97.8 °F (36.6 °C)  98.2 °F (36.8 °C)    TempSrc: Oral  Oral    SpO2:  94% 93%    Weight: 104 kg (228 lb 13.4 oz)   103 kg (228 lb)   Height:    162.6 cm (64\")       Flowsheet Rows      First Filed Value   Admission Height  157.5 cm (62\") Documented at 06/19/2021 1027   Admission Weight  102 kg (225 lb) Documented at 06/19/2021 1027           TELEMETRY: Sinus rhythm    Physical " Exam:  Constitutional:       Appearance: Well-developed.   Eyes:      General: No scleral icterus.     Conjunctiva/sclera: Conjunctivae normal.      Pupils: Pupils are equal, round, and reactive to light.   HENT:      Head: Normocephalic and atraumatic.   Neck:      Vascular: No carotid bruit or JVD.   Pulmonary:      Effort: Pulmonary effort is normal.      Breath sounds: Normal breath sounds. No wheezing. No rales.   Cardiovascular:      Normal rate. Regular rhythm.      Murmurs: There is a systolic murmur.   Pulses:     Intact distal pulses.   Abdominal:      General: Bowel sounds are normal.      Palpations: Abdomen is soft.   Musculoskeletal: Normal range of motion.      Cervical back: Normal range of motion and neck supple. Skin:     General: Skin is warm and dry.      Findings: No rash.   Neurological:      Mental Status: Alert.      Comments: No focal deficits          Results Review:   I reviewed the patient's new clinical results.  Lab Results (last 24 hours)     Procedure Component Value Units Date/Time    Magnesium [[id number]]  (Normal) Collected: 06/20/21 0850    Specimen: Blood Updated: 06/20/21 1041     Magnesium 2.2 mg/dL     Basic Metabolic Panel [[id number]]  (Abnormal) Collected: 06/20/21 0850    Specimen: Blood Updated: 06/20/21 0930     Glucose 134 mg/dL      BUN 17 mg/dL      Creatinine 1.02 mg/dL      Sodium 137 mmol/L      Potassium 4.1 mmol/L      Comment: Slight hemolysis detected by analyzer. Results may be affected.        Chloride 100 mmol/L      CO2 25.0 mmol/L      Calcium 8.5 mg/dL      eGFR Non African Amer 51 mL/min/1.73      BUN/Creatinine Ratio 16.7     Anion Gap 12.0 mmol/L     Narrative:      GFR Normal >60  Chronic Kidney Disease <60  Kidney Failure <15      CBC & Differential [[id number]]  (Abnormal) Collected: 06/20/21 0850    Specimen: Blood Updated: 06/20/21 0908    Narrative:      The following orders were created for panel order CBC & Differential.  Procedure                                Abnormality         Status                     ---------                               -----------         ------                     CBC Auto Differential[[id number]]        Abnormal            Final result                 Please view results for these tests on the individual orders.    CBC Auto Differential [[id number]]  (Abnormal) Collected: 06/20/21 0850    Specimen: Blood Updated: 06/20/21 0908     WBC 10.10 10*3/mm3      RBC 4.39 10*6/mm3      Hemoglobin 13.8 g/dL      Hematocrit 41.2 %      MCV 93.8 fL      MCH 31.6 pg      MCHC 33.6 g/dL      RDW 13.9 %      RDW-SD 45.9 fl      MPV 9.4 fL      Platelets 244 10*3/mm3      Neutrophil % 69.5 %      Lymphocyte % 14.9 %      Monocyte % 14.2 %      Eosinophil % 1.2 %      Basophil % 0.2 %      Neutrophils, Absolute 7.00 10*3/mm3      Lymphocytes, Absolute 1.50 10*3/mm3      Monocytes, Absolute 1.40 10*3/mm3      Eosinophils, Absolute 0.10 10*3/mm3      Basophils, Absolute 0.00 10*3/mm3      nRBC 0.1 /100 WBC           Imaging Results (Last 24 Hours)     Procedure Component Value Units Date/Time    XR Chest 1 View [[id number]] Collected: 06/20/21 0956     Updated: 06/20/21 1003    Narrative:      DATE OF EXAM:  6/20/2021 1:40 AM     PROCEDURE:  XR CHEST 1 VW-     INDICATIONS:  shortness of breath; R06.00-Dyspnea, unspecified; I21.3-ST elevation  (STEMI) myocardial infarction of unspecified site     COMPARISON:  AP chest x-ray 06/19/2021. No additional imaging available for  comparison.     TECHNIQUE:   Single radiographic AP view of the chest was obtained.     FINDINGS:  Cardiac silhouette remains enlarged. There is asymmetric right  interstitial and airspace opacity diffusely. Right perihilar soft  tissues are prominent. Left lung appears grossly clear. No pneumothorax  is seen.        Impression:      Asymmetric interstitial and airspace opacities throughout the right lung  with right perihilar soft tissue opacity. Recommend contrast-enhanced  CT  chest to exclude a mass.     Electronically Signed By-Gema Velez MD On:6/20/2021 10:01 AM  This report was finalized on [id number] by  Gema Velez MD.    XR Chest 1 View [[id number]] Collected: 06/19/21 1640     Updated: 06/19/21 1644    Narrative:      EXAMINATION: XR CHEST 1 VW-     DATE OF EXAM: 6/19/2021 4:15 PM     INDICATION: dyspnea; R06.00-Dyspnea, unspecified; I21.3-ST elevation  (STEMI) myocardial infarction of unspecified site.     COMPARISON: None available     TECHNIQUE: Portable AP view of the chest was obtained.     FINDINGS:  There is cardiomegaly. There is bilateral perihilar airspace opacities,  right worse than left. There is a background pulmonary interstitial  edema pattern. There is no significant pleural effusion or pneumothorax.  There are multilevel degenerative changes of the thoracic spine.       Impression:      1. Cardiomegaly with bilateral perihilar opacities, right worse than  left.  2. Background pulmonary interstitial edema pattern with Kerley B-lines.     Electronically Signed By-Donaldo Martinez MD On:6/19/2021 4:42 PM  This report was finalized on [id number] by  Donaldo Martinez MD.          EKG      I personally viewed and interpreted the patient's EKG/Telemetry data:    ECHOCARDIOGRAM:    STRESS MYOVIEW:    CARDIAC CATHETERIZATION:    OTHER:         Assessment/Plan     Active Problems:    Dyspnea  Acute coronary syndrome  Congestive heart failure with Takotsubo cardiomyopathy  Hypertension  Hyperlipidemia    Patient presented with chest pain or shortness of breath and had ST segment abnormalities in anterolateral leads  Patient underwent cardiac catheterization which showed nonobstructive disease but has significant congestive heart failure consistent with Takotsubo cardiomyopathy  Patient is started on beta-blockers along with her home dose of ACE inhibitor's  Blood pressure and heart rate stable  Patient is having an echocardiogram performed today  Patient's  electrolyte levels will be monitored carefully    I discussed the patients findings and my recommendations with patient and nurse    Bennie Pace MD  06/20/21  11:54 EDT

## 2021-06-20 NOTE — NURSING NOTE
Pt heart rate bradycardic in the 40's-50's while sleeping, 25 po metoprolol ordered from Katelynn, he states he wants the whole dose to be given.

## 2021-06-20 NOTE — PLAN OF CARE
Problem: Adult Inpatient Plan of Care  Goal: Plan of Care Review  6/20/2021 1535 by Loly Salmon RN  Outcome: Ongoing, Progressing  6/20/2021 1535 by Loly Salmon RN  Outcome: Ongoing, Progressing  Goal: Patient-Specific Goal (Individualized)  6/20/2021 1535 by Loly Salmon RN  Outcome: Ongoing, Progressing  6/20/2021 1535 by Loly Salmon RN  Outcome: Ongoing, Progressing  Goal: Absence of Hospital-Acquired Illness or Injury  6/20/2021 1535 by Loly Salmon RN  Outcome: Ongoing, Progressing  6/20/2021 1535 by Loly Salmon RN  Outcome: Ongoing, Progressing  Intervention: Identify and Manage Fall Risk  Recent Flowsheet Documentation  Taken 6/20/2021 1400 by Loly Salmon RN  Safety Promotion/Fall Prevention:   safety round/check completed   fall prevention program maintained   gait belt  Taken 6/20/2021 1300 by Loly Salmon RN  Safety Promotion/Fall Prevention:   safety round/check completed   fall prevention program maintained   gait belt  Taken 6/20/2021 1200 by Loly Salmon RN  Safety Promotion/Fall Prevention:   safety round/check completed   fall prevention program maintained   gait belt  Taken 6/20/2021 1100 by Loly Salmon RN  Safety Promotion/Fall Prevention:   safety round/check completed   fall prevention program maintained   gait belt  Taken 6/20/2021 0900 by Loly Salmon RN  Safety Promotion/Fall Prevention:   safety round/check completed   gait belt   fall prevention program maintained  Taken 6/20/2021 0846 by Loly Salmon RN  Safety Promotion/Fall Prevention:   safety round/check completed   fall prevention program maintained   gait belt  Taken 6/20/2021 0800 by Loly Salmon RN  Safety Promotion/Fall Prevention:   safety round/check completed   fall prevention program maintained   gait belt  Taken 6/20/2021 0700 by Loly Salmon RN  Safety Promotion/Fall Prevention:   safety round/check completed   gait belt   fall prevention program  maintained  Intervention: Prevent Skin Injury  Recent Flowsheet Documentation  Taken 6/20/2021 1400 by Loly Salmon RN  Body Position: position changed independently  Taken 6/20/2021 1300 by Loly Salmon RN  Body Position: position changed independently  Taken 6/20/2021 1200 by Loly Salmon RN  Body Position: position changed independently  Taken 6/20/2021 0846 by Loly Salmon RN  Body Position:   position maintained   position changed independently  Goal: Optimal Comfort and Wellbeing  6/20/2021 1535 by Loly Salmon RN  Outcome: Ongoing, Progressing  6/20/2021 1535 by Loly Salmon RN  Outcome: Ongoing, Progressing  Intervention: Provide Person-Centered Care  Recent Flowsheet Documentation  Taken 6/20/2021 1200 by Loly Salmon RN  Trust Relationship/Rapport:   care explained   choices provided   emotional support provided  Taken 6/20/2021 0846 by Loly Salmon RN  Trust Relationship/Rapport:   care explained   choices provided   emotional support provided  Goal: Readiness for Transition of Care  6/20/2021 1535 by Loly Salmon RN  Outcome: Ongoing, Progressing  6/20/2021 1535 by Loly Salmon RN  Outcome: Ongoing, Progressing     Problem: Fall Injury Risk  Goal: Absence of Fall and Fall-Related Injury  6/20/2021 1535 by Loly Salmon RN  Outcome: Ongoing, Progressing  6/20/2021 1535 by Loly Salmon RN  Outcome: Ongoing, Progressing  Intervention: Identify and Manage Contributors to Fall Injury Risk  Recent Flowsheet Documentation  Taken 6/20/2021 1400 by Loly Salmon RN  Medication Review/Management: medications reviewed  Taken 6/20/2021 1300 by Loly Salmon RN  Medication Review/Management: medications reviewed  Taken 6/20/2021 1200 by Loly Salmon RN  Medication Review/Management: medications reviewed  Taken 6/20/2021 1100 by Loly Salmon RN  Medication Review/Management: medications reviewed  Taken 6/20/2021 0846 by Loly Salmon RN  Medication  Review/Management: medications reviewed  Taken 6/20/2021 0800 by Loly Salmon RN  Medication Review/Management: medications reviewed  Taken 6/20/2021 0700 by Loly Salmon RN  Medication Review/Management: medications reviewed  Intervention: Promote Injury-Free Environment  Recent Flowsheet Documentation  Taken 6/20/2021 1400 by Loly Salmon RN  Safety Promotion/Fall Prevention:   safety round/check completed   fall prevention program maintained   gait belt  Taken 6/20/2021 1300 by Loly Salmon RN  Safety Promotion/Fall Prevention:   safety round/check completed   fall prevention program maintained   gait belt  Taken 6/20/2021 1200 by Loly Salmon RN  Safety Promotion/Fall Prevention:   safety round/check completed   fall prevention program maintained   gait belt  Taken 6/20/2021 1100 by Loly Salmon RN  Safety Promotion/Fall Prevention:   safety round/check completed   fall prevention program maintained   gait belt  Taken 6/20/2021 0900 by Loly Salmon RN  Safety Promotion/Fall Prevention:   safety round/check completed   gait belt   fall prevention program maintained  Taken 6/20/2021 0846 by Loly Salmon RN  Safety Promotion/Fall Prevention:   safety round/check completed   fall prevention program maintained   gait belt  Taken 6/20/2021 0800 by Loly Salmon RN  Safety Promotion/Fall Prevention:   safety round/check completed   fall prevention program maintained   gait belt  Taken 6/20/2021 0700 by Loly Salmon RN  Safety Promotion/Fall Prevention:   safety round/check completed   gait belt   fall prevention program maintained     Problem: Chest Pain  Goal: Resolution of Chest Pain Symptoms  6/20/2021 1535 by Loly Salmon RN  Outcome: Ongoing, Progressing  6/20/2021 1535 by Loly Salmon RN  Outcome: Ongoing, Progressing     Problem: Adjustment to Illness (Acute Coronary Syndrome)  Goal: Optimal Adaptation to Illness  6/20/2021 1535 by Loly Salmon RN  Outcome:  Ongoing, Progressing  6/20/2021 1535 by Loly Salmon RN  Outcome: Ongoing, Progressing     Problem: Arrhythmia/Dysrhythmia (Acute Coronary Syndrome)  Goal: Normalized Cardiac Rhythm  6/20/2021 1535 by Loly Salmon RN  Outcome: Ongoing, Progressing  6/20/2021 1535 by Loly Salmon RN  Outcome: Ongoing, Progressing     Problem: Cardiac-Related Pain (Acute Coronary Syndrome)  Goal: Absence of Cardiac-Related Pain  6/20/2021 1535 by Loly Salmon RN  Outcome: Ongoing, Progressing  6/20/2021 1535 by Loly Salmon RN  Outcome: Ongoing, Progressing     Problem: Hemodynamic Instability (Acute Coronary Syndrome)  Goal: Effective Cardiac Pump Function  6/20/2021 1535 by Loly Salmon RN  Outcome: Ongoing, Progressing  6/20/2021 1535 by Loly Salmon RN  Outcome: Ongoing, Progressing     Problem: Tissue Perfusion (Acute Coronary Syndrome)  Goal: Adequate Tissue Perfusion  6/20/2021 1535 by Loly Salmon RN  Outcome: Ongoing, Progressing  6/20/2021 1535 by Loly Salmon RN  Outcome: Ongoing, Progressing  Intervention: Optimize Cardiac Tissue Perfusion  Recent Flowsheet Documentation  Taken 6/20/2021 1400 by Loly Samlon RN  Activity Management:   up in chair   ambulated to bathroom  Taken 6/20/2021 1300 by Loly Salmon RN  Activity Management: up in chair  Taken 6/20/2021 1200 by Loly Salmon RN  Activity Management:   up in chair   ambulated to bathroom  Taken 6/20/2021 1100 by Loly Salmon RN  Activity Management: up in chair  Taken 6/20/2021 1000 by Loly Salmon RN  Activity Management: up in chair  Taken 6/20/2021 0900 by Loly Salmon RN  Activity Management: up in chair  Taken 6/20/2021 0846 by Loly Salmon RN  Activity Management:   activity adjusted per tolerance   up in chair  Taken 6/20/2021 0800 by Loly Salmon RN  Activity Management:   activity adjusted per tolerance   up in chair  Taken 6/20/2021 0700 by Loly Salmon RN  Activity Management:    activity adjusted per tolerance   up in chair     Problem: Heart Failure Comorbidity  Goal: Maintenance of Heart Failure Symptom Control  Outcome: Ongoing, Progressing  Intervention: Maintain Heart Failure-Management Strategies  Recent Flowsheet Documentation  Taken 6/20/2021 1400 by Loly Salmon RN  Medication Review/Management: medications reviewed  Taken 6/20/2021 1300 by Loly Salmon RN  Medication Review/Management: medications reviewed  Taken 6/20/2021 1200 by Loly Salmon RN  Medication Review/Management: medications reviewed  Taken 6/20/2021 1100 by Loly Salmon RN  Medication Review/Management: medications reviewed  Taken 6/20/2021 0846 by Loly Salmon RN  Medication Review/Management: medications reviewed  Taken 6/20/2021 0800 by Loly Salmon RN  Medication Review/Management: medications reviewed  Taken 6/20/2021 0700 by Loly Salmon RN  Medication Review/Management: medications reviewed   Goal Outcome Evaluation:   Pt up in chair for most of shift, 3L NC w dyspnea on exertion, diuresing w Lasix. Echo pending and venous duplex shows superficial thrombophlebitis in R leg. C/o abdominal fullness and cramping, miralax ordered. Continue to observe.

## 2021-06-20 NOTE — PLAN OF CARE
Problem: Adult Inpatient Plan of Care  Goal: Plan of Care Review  Outcome: Ongoing, Not Progressing  Flowsheets (Taken 6/20/2021 0257)  Progress: no change  Plan of Care Reviewed With: patient  Outcome Summary: Patient had a heart cath yesterday with Katelynn without intervention. Patient's xray shows pulmonary interstitial edema and blilateral perihilar opacities with the right being worse than the left. Another xray was taken this am when patient became SOA after turning on her left side. Awaiting results and IVF are off. Patient has reported abdominal discomfort in the upper middle quadrant and along her left side. 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  Goal: Patient-Specific Goal (Individualized)  Outcome: Ongoing, Not Progressing  Goal: Absence of Hospital-Acquired Illness or Injury  Outcome: Ongoing, Not Progressing  Intervention: Identify and Manage Fall Risk  Recent Flowsheet Documentation  Taken 6/20/2021 0010 by Ele Torres, RN  Safety Promotion/Fall Prevention:   safety round/check completed   room organization consistent   nonskid shoes/slippers when out of bed   lighting adjusted   fall prevention program maintained   clutter free environment maintained   assistive device/personal items within reach  Taken 6/19/2021 2300 by Ele Torres, RN  Safety Promotion/Fall Prevention: safety round/check completed  Taken 6/19/2021 2200 by Ele Torres, CIARA  Safety Promotion/Fall Prevention: safety round/check completed  Taken 6/19/2021 2100 by Ele Torres, RN  Safety Promotion/Fall Prevention: safety round/check completed  Taken 6/19/2021 2000 by Ele Torres, RN  Safety Promotion/Fall Prevention:   safety round/check completed   room organization consistent   nonskid shoes/slippers when out of bed   lighting adjusted   fall prevention program maintained   clutter free environment maintained   assistive device/personal items within reach  Taken 6/19/2021 1900 by Ele Torres, RN  Safety Promotion/Fall Prevention: safety round/check  completed  Intervention: Prevent Skin Injury  Recent Flowsheet Documentation  Taken 6/20/2021 0010 by Ele Torres RN  Body Position:   position changed independently   side-lying, right  Taken 6/19/2021 2300 by Ele Torres RN  Body Position: position changed independently  Taken 6/19/2021 2200 by Ele Torres RN  Body Position: position changed independently  Taken 6/19/2021 2100 by Ele Torres RN  Body Position: position changed independently  Taken 6/19/2021 1900 by Ele Torres RN  Body Position: sitting up in bed  Intervention: Prevent and Manage VTE (venous thromboembolism) Risk  Recent Flowsheet Documentation  Taken 6/20/2021 0010 by Ele Torres RN  VTE Prevention/Management:   bilateral   sequential compression devices off   dorsiflexion/plantar flexion performed  Taken 6/19/2021 2000 by Ele Torres RN  VTE Prevention/Management: sequential compression devices off  Intervention: Prevent Infection  Recent Flowsheet Documentation  Taken 6/20/2021 0010 by Ele Torres RN  Infection Prevention:   rest/sleep promoted   single patient room provided  Taken 6/19/2021 2000 by Ele Torres RN  Infection Prevention:   rest/sleep promoted   single patient room provided  Goal: Optimal Comfort and Wellbeing  Outcome: Ongoing, Not Progressing  Intervention: Provide Person-Centered Care  Recent Flowsheet Documentation  Taken 6/20/2021 0010 by Ele Torres RN  Trust Relationship/Rapport:   care explained   choices provided   questions answered   questions encouraged   thoughts/feelings acknowledged  Goal: Readiness for Transition of Care  Outcome: Ongoing, Not Progressing     Problem: Fall Injury Risk  Goal: Absence of Fall and Fall-Related Injury  Outcome: Ongoing, Not Progressing  Intervention: Identify and Manage Contributors to Fall Injury Risk  Recent Flowsheet Documentation  Taken 6/20/2021 0010 by Ele Torres RN  Medication Review/Management: medications reviewed  Self-Care Promotion: BADL personal objects within  reach  Intervention: Promote Injury-Free Environment  Recent Flowsheet Documentation  Taken 6/20/2021 0010 by Ele Torres RN  Safety Promotion/Fall Prevention:   safety round/check completed   room organization consistent   nonskid shoes/slippers when out of bed   lighting adjusted   fall prevention program maintained   clutter free environment maintained   assistive device/personal items within reach  Taken 6/19/2021 2300 by Ele Torres RN  Safety Promotion/Fall Prevention: safety round/check completed  Taken 6/19/2021 2200 by Ele Torres RN  Safety Promotion/Fall Prevention: safety round/check completed  Taken 6/19/2021 2100 by Ele Torres RN  Safety Promotion/Fall Prevention: safety round/check completed  Taken 6/19/2021 2000 by Ele Torres RN  Safety Promotion/Fall Prevention:   safety round/check completed   room organization consistent   nonskid shoes/slippers when out of bed   lighting adjusted   fall prevention program maintained   clutter free environment maintained   assistive device/personal items within reach  Taken 6/19/2021 1900 by Ele Torres RN  Safety Promotion/Fall Prevention: safety round/check completed     Problem: Chest Pain  Goal: Resolution of Chest Pain Symptoms  Outcome: Ongoing, Not Progressing     Problem: Adjustment to Illness (Acute Coronary Syndrome)  Goal: Optimal Adaptation to Illness  Outcome: Ongoing, Not Progressing  Intervention: Support Adjustment to Life-Changing Event  Recent Flowsheet Documentation  Taken 6/20/2021 0010 by Ele Torres RN  Family/Support System Care: support provided     Problem: Arrhythmia/Dysrhythmia (Acute Coronary Syndrome)  Goal: Normalized Cardiac Rhythm  Outcome: Ongoing, Not Progressing  Intervention: Monitor and Manage Cardiac Rhythm Effects  Recent Flowsheet Documentation  Taken 6/20/2021 0010 by Ele Torres RN  VTE Prevention/Management:   bilateral   sequential compression devices off   dorsiflexion/plantar flexion performed  Taken  6/19/2021 2000 by Ele Torres RN  VTE Prevention/Management: sequential compression devices off     Problem: Cardiac-Related Pain (Acute Coronary Syndrome)  Goal: Absence of Cardiac-Related Pain  Outcome: Ongoing, Not Progressing     Problem: Hemodynamic Instability (Acute Coronary Syndrome)  Goal: Effective Cardiac Pump Function  Outcome: Ongoing, Not Progressing     Problem: Tissue Perfusion (Acute Coronary Syndrome)  Goal: Adequate Tissue Perfusion  Outcome: Ongoing, Not Progressing  Intervention: Optimize Cardiac Tissue Perfusion  Recent Flowsheet Documentation  Taken 6/20/2021 0010 by Ele Torres RN  Activity Management: activity encouraged  Taken 6/19/2021 2300 by Ele Torres RN  Activity Management: activity encouraged  Taken 6/19/2021 2200 by Ele Torres RN  Activity Management: activity encouraged  Taken 6/19/2021 2100 by Ele Torres RN  Activity Management: activity encouraged  Taken 6/19/2021 2000 by Ele Torres RN  Activity Management: ambulated to bathroom  Taken 6/19/2021 1900 by Ele Torres RN  Activity Management: bedrest   Goal Outcome Evaluation:  Plan of Care Reviewed With: patient        Progress: no change  Outcome Summary: Patient had a heart cath yesterday with Katelynn without intervention. Patient's xray shows pulmonary interstitial edema and blilateral perihilar opacities with the right being worse than the left. Another xray was taken this am when patient became SOA after turning on her left side. Awaiting results and IVF are off. Patient has reported abdominal discomfort in the upper middle quadrant and along her left side. 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

## 2021-06-20 NOTE — PROGRESS NOTES
"ICU Daily Progress Note        Dyspnea     ASSESSMENT & PLAN   Acute coronary syndrome  Takotsubo cardiomyopathy  Hypertension  Essential hyperlipidemia  GERD  NO DVT, has SVT     Plan  Lasix 20 mg po q 8h  On 3 L of O2, weaning  maintain a saturation of 92%, currently on 4 L    PO Doxycyline    Continue aspirin  Continue lisinopril and beta-blocker       Code Status: Full code  VTE Prophylaxis: SCDs, early mobilization  PUD Prophylaxis: Not indicated, tolerating p.o. intake          LOS: 1 day         Vital signs for last 24 hours:  Vitals:    06/20/21 0800 06/20/21 1023 06/20/21 1200 06/20/21 1203   BP: 113/68 104/78  98/57   Pulse: 61   60   Resp:       Temp: 98.2 °F (36.8 °C)  98.1 °F (36.7 °C)    TempSrc: Oral  Oral    SpO2: 93%   90%   Weight:  103 kg (228 lb)     Height:  162.6 cm (64\")         Intake/Output last 3 shifts:  I/O last 3 completed shifts:  In: 1757.5 [P.O.:1385; I.V.:372.5]  Out: 1195 [Urine:1195]  Intake/Output this shift:  I/O this shift:  In: 360 [P.O.:360]  Out: 1650 [Urine:1650]    Vent settings for last 24 hours:       Hemodynamic parameters for last 24 hours:       Radiology  Imaging Results (Last 24 Hours)     Procedure Component Value Units Date/Time    XR Chest 1 View [[id number]] Collected: 06/20/21 0956     Updated: 06/20/21 1003    Narrative:      DATE OF EXAM:  6/20/2021 1:40 AM     PROCEDURE:  XR CHEST 1 VW-     INDICATIONS:  shortness of breath; R06.00-Dyspnea, unspecified; I21.3-ST elevation  (STEMI) myocardial infarction of unspecified site     COMPARISON:  AP chest x-ray 06/19/2021. No additional imaging available for  comparison.     TECHNIQUE:   Single radiographic AP view of the chest was obtained.     FINDINGS:  Cardiac silhouette remains enlarged. There is asymmetric right  interstitial and airspace opacity diffusely. Right perihilar soft  tissues are prominent. Left lung appears grossly clear. No pneumothorax  is seen.        Impression:      Asymmetric interstitial and " airspace opacities throughout the right lung  with right perihilar soft tissue opacity. Recommend contrast-enhanced CT  chest to exclude a mass.     Electronically Signed By-Gema Velez MD On:6/20/2021 10:01 AM  This report was finalized on [id number] by  Gema Velez MD.          Labs:  Results from last 7 days   Lab Units 06/20/21  0850   WBC 10*3/mm3 10.10   HEMOGLOBIN g/dL 13.8   HEMATOCRIT % 41.2   PLATELETS 10*3/mm3 244     Results from last 7 days   Lab Units 06/20/21  0850   SODIUM mmol/L 137   POTASSIUM mmol/L 4.1   CHLORIDE mmol/L 100   CO2 mmol/L 25.0   BUN mg/dL 17   CREATININE mg/dL 1.02*   CALCIUM mg/dL 8.5*   GLUCOSE mg/dL 134*     Results from last 7 days   Lab Units 06/19/21  1055   PH, ARTERIAL pH units 7.394   PO2 ART mm Hg 58.7*   PCO2, ARTERIAL mm Hg 37.4   HCO3 ART mmol/L 22.8         Results from last 7 days   Lab Units 06/19/21  1053   CK TOTAL U/L 169   TROPONIN T ng/mL 0.188*         Results from last 7 days   Lab Units 06/20/21  0850   MAGNESIUM mg/dL 2.2     Results from last 7 days   Lab Units 06/19/21  1053   INR  1.07   APTT seconds 25.2               Meds:   SCHEDULE  aspirin, 81 mg, Oral, Daily  furosemide, 20 mg, Oral, Q8H  lisinopril, 40 mg, Oral, Daily  metoprolol tartrate, 25 mg, Oral, Q12H  oxybutynin, 5 mg, Oral, BID  pantoprazole, 40 mg, Oral, Q AM      Infusions     PRNs  •  acetaminophen  •  atropine  •  [COMPLETED] Insert peripheral IV **AND** sodium chloride  •  sodium chloride    Physical Exam:  Physical Exam  Cardiovascular:      Heart sounds: Murmur heard.     Pulmonary:      Breath sounds: Rhonchi and rales present.         ROS  Review of Systems   Respiratory: Positive for cough and shortness of breath.          Critical Care Time greater than: 45 Minutes  Total time spent with patient greater than: 45 Minutes

## 2021-06-21 LAB
ANION GAP SERPL CALCULATED.3IONS-SCNC: 11 MMOL/L (ref 5–15)
BASOPHILS # BLD AUTO: 0.1 10*3/MM3 (ref 0–0.2)
BASOPHILS NFR BLD AUTO: 0.5 % (ref 0–1.5)
BH CV ECHO MEAS - % IVS THICK: 4.7 %
BH CV ECHO MEAS - % LVPW THICK: -4.1 %
BH CV ECHO MEAS - ACS: 1.5 CM
BH CV ECHO MEAS - AO MAX PG (FULL): 5.9 MMHG
BH CV ECHO MEAS - AO MAX PG: 11.7 MMHG
BH CV ECHO MEAS - AO MEAN PG (FULL): 3.6 MMHG
BH CV ECHO MEAS - AO MEAN PG: 6.8 MMHG
BH CV ECHO MEAS - AO ROOT AREA (BSA CORRECTED): 1.5
BH CV ECHO MEAS - AO ROOT AREA: 7.4 CM^2
BH CV ECHO MEAS - AO ROOT DIAM: 3.1 CM
BH CV ECHO MEAS - AO V2 MAX: 171.2 CM/SEC
BH CV ECHO MEAS - AO V2 MEAN: 126.1 CM/SEC
BH CV ECHO MEAS - AO V2 VTI: 38.3 CM
BH CV ECHO MEAS - ASC AORTA: 2.8 CM
BH CV ECHO MEAS - AVA(I,A): 2.1 CM^2
BH CV ECHO MEAS - AVA(I,D): 2.1 CM^2
BH CV ECHO MEAS - AVA(V,A): 2.3 CM^2
BH CV ECHO MEAS - AVA(V,D): 2.3 CM^2
BH CV ECHO MEAS - BSA(HAYCOCK): 2.2 M^2
BH CV ECHO MEAS - BSA: 2.1 M^2
BH CV ECHO MEAS - BZI_BMI: 39.1 KILOGRAMS/M^2
BH CV ECHO MEAS - BZI_METRIC_HEIGHT: 162.6 CM
BH CV ECHO MEAS - BZI_METRIC_WEIGHT: 103.4 KG
BH CV ECHO MEAS - EDV(CUBED): 111.3 ML
BH CV ECHO MEAS - EDV(MOD-SP4): 85.8 ML
BH CV ECHO MEAS - EDV(TEICH): 108 ML
BH CV ECHO MEAS - EF(CUBED): 38.7 %
BH CV ECHO MEAS - EF(MOD-BP): 34 %
BH CV ECHO MEAS - EF(MOD-SP4): 33.8 %
BH CV ECHO MEAS - EF(TEICH): 31.8 %
BH CV ECHO MEAS - ESV(CUBED): 68.3 ML
BH CV ECHO MEAS - ESV(MOD-SP4): 56.8 ML
BH CV ECHO MEAS - ESV(TEICH): 73.7 ML
BH CV ECHO MEAS - FS: 15 %
BH CV ECHO MEAS - IVS/LVPW: 1.1
BH CV ECHO MEAS - IVSD: 1.1 CM
BH CV ECHO MEAS - IVSS: 1.2 CM
BH CV ECHO MEAS - LA DIMENSION(2D): 4 CM
BH CV ECHO MEAS - LV DIASTOLIC VOL/BSA (35-75): 41.5 ML/M^2
BH CV ECHO MEAS - LV MASS(C)D: 191.2 GRAMS
BH CV ECHO MEAS - LV MASS(C)DI: 92.4 GRAMS/M^2
BH CV ECHO MEAS - LV MASS(C)S: 148.8 GRAMS
BH CV ECHO MEAS - LV MASS(C)SI: 72 GRAMS/M^2
BH CV ECHO MEAS - LV MAX PG: 5.8 MMHG
BH CV ECHO MEAS - LV MEAN PG: 3.3 MMHG
BH CV ECHO MEAS - LV SYSTOLIC VOL/BSA (12-30): 27.5 ML/M^2
BH CV ECHO MEAS - LV V1 MAX: 120.7 CM/SEC
BH CV ECHO MEAS - LV V1 MEAN: 82.8 CM/SEC
BH CV ECHO MEAS - LV V1 VTI: 24.6 CM
BH CV ECHO MEAS - LVIDD: 4.8 CM
BH CV ECHO MEAS - LVIDS: 4.1 CM
BH CV ECHO MEAS - LVOT AREA: 3.3 CM^2
BH CV ECHO MEAS - LVOT DIAM: 2.1 CM
BH CV ECHO MEAS - LVPWD: 1 CM
BH CV ECHO MEAS - LVPWS: 1 CM
BH CV ECHO MEAS - MV A MAX VEL: 88.3 CM/SEC
BH CV ECHO MEAS - MV DEC SLOPE: 520.4 CM/SEC^2
BH CV ECHO MEAS - MV DEC TIME: 0.22 SEC
BH CV ECHO MEAS - MV E MAX VEL: 114.4 CM/SEC
BH CV ECHO MEAS - MV E/A: 1.3
BH CV ECHO MEAS - MV MAX PG: 5.6 MMHG
BH CV ECHO MEAS - MV MEAN PG: 1.5 MMHG
BH CV ECHO MEAS - MV V2 MAX: 117.9 CM/SEC
BH CV ECHO MEAS - MV V2 MEAN: 55.7 CM/SEC
BH CV ECHO MEAS - MV V2 VTI: 34.9 CM
BH CV ECHO MEAS - MVA(VTI): 2.3 CM^2
BH CV ECHO MEAS - PA ACC TIME: 0.09 SEC
BH CV ECHO MEAS - PA MAX PG (FULL): 0.13 MMHG
BH CV ECHO MEAS - PA MAX PG: 2.1 MMHG
BH CV ECHO MEAS - PA MEAN PG (FULL): 0.26 MMHG
BH CV ECHO MEAS - PA MEAN PG: 1.2 MMHG
BH CV ECHO MEAS - PA PR(ACCEL): 40.1 MMHG
BH CV ECHO MEAS - PA V2 MAX: 72.3 CM/SEC
BH CV ECHO MEAS - PA V2 MEAN: 54.1 CM/SEC
BH CV ECHO MEAS - PA V2 VTI: 17.6 CM
BH CV ECHO MEAS - RAP SYSTOLE: 8 MMHG
BH CV ECHO MEAS - RV MAX PG: 2 MMHG
BH CV ECHO MEAS - RV MEAN PG: 0.98 MMHG
BH CV ECHO MEAS - RV V1 MAX: 70 CM/SEC
BH CV ECHO MEAS - RV V1 MEAN: 46 CM/SEC
BH CV ECHO MEAS - RV V1 VTI: 13.8 CM
BH CV ECHO MEAS - RVDD: 2.7 CM
BH CV ECHO MEAS - RVSP: 39.6 MMHG
BH CV ECHO MEAS - SI(AO): 137.3 ML/M^2
BH CV ECHO MEAS - SI(CUBED): 20.8 ML/M^2
BH CV ECHO MEAS - SI(LVOT): 39.3 ML/M^2
BH CV ECHO MEAS - SI(MOD-SP4): 14 ML/M^2
BH CV ECHO MEAS - SI(TEICH): 16.6 ML/M^2
BH CV ECHO MEAS - SV(AO): 283.9 ML
BH CV ECHO MEAS - SV(CUBED): 43 ML
BH CV ECHO MEAS - SV(LVOT): 81.3 ML
BH CV ECHO MEAS - SV(MOD-SP4): 29 ML
BH CV ECHO MEAS - SV(TEICH): 34.4 ML
BH CV ECHO MEAS - TR MAX VEL: 280.9 CM/SEC
BUN SERPL-MCNC: 26 MG/DL (ref 8–23)
BUN/CREAT SERPL: 24.8 (ref 7–25)
CA-I SERPL ISE-MCNC: 1.18 MMOL/L (ref 1.2–1.3)
CALCIUM SPEC-SCNC: 8.5 MG/DL (ref 8.6–10.5)
CHLORIDE SERPL-SCNC: 101 MMOL/L (ref 98–107)
CHOLEST SERPL-MCNC: 199 MG/DL (ref 0–200)
CO2 SERPL-SCNC: 27 MMOL/L (ref 22–29)
CREAT SERPL-MCNC: 1.05 MG/DL (ref 0.57–1)
DEPRECATED RDW RBC AUTO: 44.2 FL (ref 37–54)
EOSINOPHIL # BLD AUTO: 0.3 10*3/MM3 (ref 0–0.4)
EOSINOPHIL NFR BLD AUTO: 2.4 % (ref 0.3–6.2)
ERYTHROCYTE [DISTWIDTH] IN BLOOD BY AUTOMATED COUNT: 13.5 % (ref 12.3–15.4)
GFR SERPL CREATININE-BSD FRML MDRD: 50 ML/MIN/1.73
GLUCOSE BLDC GLUCOMTR-MCNC: 149 MG/DL (ref 70–105)
GLUCOSE BLDC GLUCOMTR-MCNC: 150 MG/DL (ref 70–105)
GLUCOSE SERPL-MCNC: 129 MG/DL (ref 65–99)
HBA1C MFR BLD: 6.5 % (ref 3.5–5.6)
HCT VFR BLD AUTO: 37.1 % (ref 34–46.6)
HDLC SERPL-MCNC: 48 MG/DL (ref 40–60)
HGB BLD-MCNC: 12.4 G/DL (ref 12–15.9)
LDLC SERPL CALC-MCNC: 127 MG/DL (ref 0–100)
LDLC/HDLC SERPL: 2.59 {RATIO}
LYMPHOCYTES # BLD AUTO: 1.6 10*3/MM3 (ref 0.7–3.1)
LYMPHOCYTES NFR BLD AUTO: 14.8 % (ref 19.6–45.3)
MAXIMAL PREDICTED HEART RATE: 134 BPM
MCH RBC QN AUTO: 31.3 PG (ref 26.6–33)
MCHC RBC AUTO-ENTMCNC: 33.5 G/DL (ref 31.5–35.7)
MCV RBC AUTO: 93.5 FL (ref 79–97)
MONOCYTES # BLD AUTO: 1.4 10*3/MM3 (ref 0.1–0.9)
MONOCYTES NFR BLD AUTO: 13 % (ref 5–12)
NEUTROPHILS NFR BLD AUTO: 69.3 % (ref 42.7–76)
NEUTROPHILS NFR BLD AUTO: 7.4 10*3/MM3 (ref 1.7–7)
NRBC BLD AUTO-RTO: 0 /100 WBC (ref 0–0.2)
PLATELET # BLD AUTO: 265 10*3/MM3 (ref 140–450)
PMV BLD AUTO: 9.3 FL (ref 6–12)
POTASSIUM SERPL-SCNC: 4.2 MMOL/L (ref 3.5–5.2)
RBC # BLD AUTO: 3.97 10*6/MM3 (ref 3.77–5.28)
SODIUM SERPL-SCNC: 139 MMOL/L (ref 136–145)
STRESS TARGET HR: 114 BPM
TRIGL SERPL-MCNC: 134 MG/DL (ref 0–150)
VLDLC SERPL-MCNC: 24 MG/DL (ref 5–40)
WBC # BLD AUTO: 10.7 10*3/MM3 (ref 3.4–10.8)

## 2021-06-21 PROCEDURE — 80048 BASIC METABOLIC PNL TOTAL CA: CPT | Performed by: NURSE PRACTITIONER

## 2021-06-21 PROCEDURE — 80061 LIPID PANEL: CPT | Performed by: NURSE PRACTITIONER

## 2021-06-21 PROCEDURE — 25010000002 HEPARIN (PORCINE) PER 1000 UNITS: Performed by: HOSPITALIST

## 2021-06-21 PROCEDURE — 82962 GLUCOSE BLOOD TEST: CPT

## 2021-06-21 PROCEDURE — 85025 COMPLETE CBC W/AUTO DIFF WBC: CPT | Performed by: NURSE PRACTITIONER

## 2021-06-21 PROCEDURE — 82330 ASSAY OF CALCIUM: CPT | Performed by: NURSE PRACTITIONER

## 2021-06-21 PROCEDURE — 99233 SBSQ HOSP IP/OBS HIGH 50: CPT | Performed by: INTERNAL MEDICINE

## 2021-06-21 PROCEDURE — 99232 SBSQ HOSP IP/OBS MODERATE 35: CPT | Performed by: HOSPITALIST

## 2021-06-21 PROCEDURE — 83036 HEMOGLOBIN GLYCOSYLATED A1C: CPT | Performed by: NURSE PRACTITIONER

## 2021-06-21 RX ORDER — INSULIN LISPRO 100 [IU]/ML
0-7 INJECTION, SOLUTION INTRAVENOUS; SUBCUTANEOUS
Status: DISCONTINUED | OUTPATIENT
Start: 2021-06-21 | End: 2021-06-22 | Stop reason: HOSPADM

## 2021-06-21 RX ORDER — DEXTROSE MONOHYDRATE 25 G/50ML
25 INJECTION, SOLUTION INTRAVENOUS
Status: DISCONTINUED | OUTPATIENT
Start: 2021-06-21 | End: 2021-06-22 | Stop reason: HOSPADM

## 2021-06-21 RX ORDER — NICOTINE POLACRILEX 4 MG
15 LOZENGE BUCCAL
Status: DISCONTINUED | OUTPATIENT
Start: 2021-06-21 | End: 2021-06-22 | Stop reason: HOSPADM

## 2021-06-21 RX ORDER — HEPARIN SODIUM 5000 [USP'U]/ML
5000 INJECTION, SOLUTION INTRAVENOUS; SUBCUTANEOUS EVERY 8 HOURS SCHEDULED
Status: DISCONTINUED | OUTPATIENT
Start: 2021-06-21 | End: 2021-06-22 | Stop reason: HOSPADM

## 2021-06-21 RX ORDER — INSULIN LISPRO 100 [IU]/ML
0-7 INJECTION, SOLUTION INTRAVENOUS; SUBCUTANEOUS AS NEEDED
Status: DISCONTINUED | OUTPATIENT
Start: 2021-06-21 | End: 2021-06-22 | Stop reason: HOSPADM

## 2021-06-21 RX ADMIN — HEPARIN SODIUM 5000 UNITS: 5000 INJECTION INTRAVENOUS; SUBCUTANEOUS at 17:21

## 2021-06-21 RX ADMIN — METOPROLOL TARTRATE 25 MG: 25 TABLET, FILM COATED ORAL at 09:57

## 2021-06-21 RX ADMIN — OXYBUTYNIN CHLORIDE 5 MG: 5 TABLET ORAL at 09:57

## 2021-06-21 RX ADMIN — FUROSEMIDE 20 MG: 20 TABLET ORAL at 06:02

## 2021-06-21 RX ADMIN — METOPROLOL TARTRATE 25 MG: 25 TABLET, FILM COATED ORAL at 20:12

## 2021-06-21 RX ADMIN — DOXYCYCLINE 100 MG: 100 TABLET, FILM COATED ORAL at 09:57

## 2021-06-21 RX ADMIN — OXYBUTYNIN CHLORIDE 5 MG: 5 TABLET ORAL at 20:12

## 2021-06-21 RX ADMIN — FUROSEMIDE 20 MG: 20 TABLET ORAL at 13:07

## 2021-06-21 RX ADMIN — ASPIRIN 81 MG CHEWABLE TABLET 81 MG: 81 TABLET CHEWABLE at 09:57

## 2021-06-21 RX ADMIN — PANTOPRAZOLE SODIUM 40 MG: 40 TABLET, DELAYED RELEASE ORAL at 06:02

## 2021-06-21 RX ADMIN — FUROSEMIDE 20 MG: 20 TABLET ORAL at 20:12

## 2021-06-21 RX ADMIN — DOXYCYCLINE 100 MG: 100 TABLET, FILM COATED ORAL at 20:12

## 2021-06-21 RX ADMIN — POLYETHYLENE GLYCOL 3350 17 G: 17 POWDER, FOR SOLUTION ORAL at 09:57

## 2021-06-21 RX ADMIN — HEPARIN SODIUM 5000 UNITS: 5000 INJECTION INTRAVENOUS; SUBCUTANEOUS at 20:12

## 2021-06-21 RX ADMIN — Medication 10 ML: at 20:12

## 2021-06-21 RX ADMIN — LISINOPRIL 40 MG: 20 TABLET ORAL at 09:57

## 2021-06-21 NOTE — PROGRESS NOTES
"Chief complaint shortness of breath        Subjective     Patient seen and examined at bedside.  She continues to be dyspneic especially on any exertion.  No chest pain.  She is complaining of frequent urinations as well.  No dysuria      Objective     Vital Signs  Visit Vitals  /50   Pulse 60   Temp 97.5 °F (36.4 °C) (Oral)   Resp 22   Ht 162.6 cm (64\")   Wt 101 kg (223 lb 8.7 oz)   SpO2 92%   BMI 38.37 kg/m²       Physical Exam:  Physical Exam   Constitutional:  oriented to person, place, and time. No distress.   HENT:   Head: Normocephalic and atraumatic.   Eyes: Conjunctivae and EOM are normal. Pupils are equal, round, and reactive to light.   Neck: No JVD present. No thyromegaly present.   Cardiovascular: Normal rate, regular rhythm, normal heart sounds and intact distal pulses. Exam reveals no gallop and no friction rub.   No murmur heard.  Pulmonary/Chest: Crackles bilaterally, decreased breath sounds bilaterally, no wheezing no rhonchi  Abdominal: Soft. Bowel sounds are normal.  no distension and no mass. There is no tenderness. There is no rebound and no guarding. No hernia.   Musculoskeletal: Normal range of motion.   Lymphadenopathy:     no cervical adenopathy.   Neurological:  alert and oriented to person, place, and time. No cranial nerve deficit or sensory deficit. exhibits normal muscle tone.   Skin: No rash noted.  not diaphoretic.   Psychiatric:  normal mood and affect.   Vitals reviewed.    Physical Exam          Results Review:    CMP:  Lab Results   Component Value Date    BUN 26 (H) 06/21/2021    CREATININE 1.05 (H) 06/21/2021    EGFRIFNONA 50 (L) 06/21/2021     06/21/2021    K 4.2 06/21/2021     06/21/2021    CALCIUM 8.5 (L) 06/21/2021     CBC:  Lab Results   Component Value Date    WBC 10.70 06/21/2021    RBC 3.97 06/21/2021    HGB 12.4 06/21/2021    HCT 37.1 06/21/2021    MCV 93.5 06/21/2021    MCH 31.3 06/21/2021    MCHC 33.5 06/21/2021    RDW 13.5 06/21/2021     " 06/21/2021         Medication Review:     Scheduled Meds:aspirin, 81 mg, Oral, Daily  doxycycline, 100 mg, Oral, Q12H  furosemide, 20 mg, Oral, Q8H  lisinopril, 40 mg, Oral, Daily  metoprolol tartrate, 25 mg, Oral, Q12H  oxybutynin, 5 mg, Oral, BID  pantoprazole, 40 mg, Oral, Q AM  polyethylene glycol, 17 g, Oral, Daily      Continuous Infusions:   PRN Meds:.•  acetaminophen  •  atropine  •  [COMPLETED] Insert peripheral IV **AND** sodium chloride  •  sodium chloride    Assessment/Plan   Shortness of breath  Acute  Due to Takotsubo cardiomyopathy  Status post cardiac catheterization  Status post 2D echocardiogram EF 30% per preliminary report  Continue beta-blocker and ACE inhibitor  Patient is having good urine output we will continue current dose of Lasix monitor creatinine as there is a small bump today  Cardiology on board  Chest x-ray consistent with pulmonary edema  Patient is also on antibiotic  Pulmonary following    Hypertension  Continue lisinopril and metoprolol    Kidney injury  Patient in need of further diuresis due to pulmonary status will continue Lasix and follow-up on creatinine in the morning     GERD  Continue PPI    Overactive bladder  Patient is on oxybutynin    Prediabetes  Insulin sliding scale Accu-Cheks  Follow-up with PCP for further care    DVT PUD prophylaxis    Plan as above          Vale Miller MD  06/21/21  14:26 EDT

## 2021-06-21 NOTE — PLAN OF CARE
Goal Outcome Evaluation:  Plan of Care Reviewed With: patient        Progress: no change  Outcome Summary: Patient transferred to unit during shift. Patient has experienced no acute events during shift. Patient has voiced no complaints. Will continue to monitor.

## 2021-06-21 NOTE — CASE MANAGEMENT/SOCIAL WORK
"Discharge Planning Assessment  [facility]     Patient Name: Suzette Blandon  MRN: [id number]  Today's Date: 6/21/2021    Admit Date: 6/19/2021    Discharge Needs Assessment     Row Name 06/21/21 1509       Living Environment    Lives With  spouse    Current Living Arrangements  other (see comments) [place] with built on rooms- has electric but no running water in winter time per patient    Potentially Unsafe Housing Conditions  no hot water;no indoor plumbing;other (see comments) [facility]    Primary Care Provided by  self    Provides Primary Care For  no one    Family Caregiver if Needed  spouse    Able to Return to Prior Arrangements  yes       Resource/Environmental Concerns    Resource/Environmental Concerns  environmental    Environment Concerns  indoor plumbing, none;running water, none    Transportation Concerns  car, none       Transition Planning    Patient/Family Anticipates Transition to  home with family    Patient/Family Anticipated Services at Transition      Transportation Anticipated  family or friend will provide       Discharge Needs Assessment    Readmission Within the Last 30 Days  no previous admission in last 30 days    Equipment Currently Used at Home  cane, straight;walker, standard;commode    Concerns to be Addressed  discharge planning;home safety    Anticipated Changes Related to Illness  none    Equipment Needed After Discharge  none    Discharge Facility/Level of Care Needs  independent living facility        Discharge Plan     Row Name 06/21/21 1512       Plan    Plan  anticipate routine home with family- MSW consult- patient lives in [facility]    Patient/Family in Agreement with Plan  yes    Plan Comments  met with patient at bedside. patient lives at home in [facility] with \"built on rooms\" with her spouse. patient does not drive. patient spouse provides transportation. patient pcp and pharm confirmed. patient denies issues affording food or meds. patient independent with ADLs. patient " denies needs at this time. patient states her camper does have electricity but no running water in the winter time. MSW notified. DC barriers: on 3L, echo pending, s/p heart cath day #3 with no intervention, bradycardia, pulm following, cards following.        Expected Discharge Date and Time     Expected Discharge Date Expected Discharge Time    Jun 23, 2021         Demographic Summary     Row Name 06/21/21 1508       General Information    Admission Type  inpatient    Arrived From  emergency department    Required Notices Provided  Important Message from Medicare    Referral Source  admission list    Reason for Consult  discharge planning    Preferred Language  English     Used During This Interaction  no        Functional Status     Row Name 06/21/21 1509       Functional Status    Usual Activity Tolerance  fair    Current Activity Tolerance  fair       Functional Status, IADL    Medications  independent    Meal Preparation  independent    Housekeeping  independent    Laundry  independent    Shopping  independent        Patient Forms     Row Name 06/21/21 1516       Patient Forms    Important Message from Medicare (IMM)  Delivered IMM 6/21 @ 1153 by CM    Delivered to  Patient        Met with patient in room wearing PPE: mask, face shield/goggles    Maintained distance greater than six feet and spent less than 15 minutes in the room.          Shantelle Bucio RN

## 2021-06-21 NOTE — H&P
"      [facility]      Patient Name: Suzette Blandon  : 1934  MRN: [id number]  Primary Care Physician: Bartolo Meng  Date of admission: 2021    Patient Care Team:  Bartolo Meng as PCP - General (Family Medicine)          Subjective   History Present Illness     Chief Complaint:   Chief Complaint   Patient presents with   • Shortness of Breath                  Very pleasant 86-year-old female awake alert very talkative and good historian initially admitted to the emergency department for shortness of breath and chest pressure yesterday and diagnosed withr an ST elevated MI with troponin of 0.188 and proBNP of 6849.  She denied any past medical history of coronary artery disease.  She was urgently taken to the Cath Lab yesterday.  Cardiac catheterization showed \"nonobstructive coronary disease and severe left ventricular dysfunction consistent with stress-induced cardiomyopathy\".  EF was estimated at 20 to 25%.  Echo was subsequently done but results have not been released.  He was subsequently and CVCU post cardiac catheterization for ventilatory support.  She did not require intubation.  She is currently stable on 3 L oxygen via nasal cannula.  She has been downgraded from critical care status and we have been consulted for medical management.  She reports she feels fine other than \"my breathing is not what I want to be\".  He is currently being diuresed with Lasix.  Also underwent a DVT Doppler which showed:    \" Sub-acute right lower extremity superficial thrombophlebitis noted in the greater saphenous (below knee) and small saphenous.  · All other veins appeared normal bilaterally.   \"    WBC was initially elevated at 13.1, now down to 10.1 and afebrile.  She is on p.o. doxycycline per pulmonary.  He is being followed by cardiology and placed on ACE and beta-blocker with lipid panel pending.  Creatinine mildly elevated 1.02 likely secondary to Lasix diuresis, calcium " 8.5 and on fasting glucose currently 134 with A1c pending.              Review of Systems   Constitutional: Negative.   HENT: Negative.    Eyes: Negative.    Cardiovascular: Positive for dyspnea on exertion.   Respiratory: Positive for shortness of breath.    Endocrine: Negative.    Hematologic/Lymphatic: Negative.    Skin: Negative.    Musculoskeletal: Negative.    Gastrointestinal: Negative.    Genitourinary: Negative.    Neurological: Negative.    Psychiatric/Behavioral: Negative.    Allergic/Immunologic: Negative.            Personal History     Past Medical History:   Past Medical History:   Diagnosis Date   • Arthritis    • Coronary artery disease    • Elevated cholesterol    • GERD (gastroesophageal reflux disease)    • Hypertension        Surgical History:      Past Surgical History:   Procedure Laterality Date   • ABDOMINAL SURGERY     • CARDIAC CATHETERIZATION     • EYE SURGERY      CATARACT SURGERY   • HYSTERECTOMY     • JOINT REPLACEMENT      RIGHT HIP           Family History: family history includes COPD in her father; Heart disease in her father and mother; Hypertension in her father. Family History was reviewed.     Social History:  reports that she has quit smoking. She has never used smokeless tobacco. She reports that she does not drink alcohol and does not use drugs.      Medications:  Prior to Admission medications    Medication Sig Start Date End Date Taking? Authorizing Provider   aspirin 81 MG chewable tablet Chew 81 mg Daily.   Yes Joaquim Mcguire MD   lisinopril (PRINIVIL,ZESTRIL) 40 MG tablet Take 40 mg by mouth Daily.   Yes Joaquim Mcguire MD   Misc Natural Products (Osteo Bi-Flex Joint Shield) tablet Take 1 tablet by mouth 2 (two) times a day. Osteo Bi-Flex with Turmeric   Yes Joaquim Mcguire MD   multivitamin with minerals (Centrum Adults) tablet tablet Take 1 tablet by mouth Daily.   Yes Joaquim Mcguire MD   oxybutynin (DITROPAN) 5 MG tablet Take 5 mg by mouth 2  (Two) Times a Day.   Yes Provider, MD Joaquim       Allergies:    Allergies   Allergen Reactions   • Oxycodone Unknown - Low Severity   • Penicillins Unknown - Low Severity   • Polyoxyethylene Lauryl Ether [Sorbitan] Unknown - High Severity       Objective   Objective     Vital Signs  Temp:  [97.8 °F (36.6 °C)-98.3 °F (36.8 °C)] 98.3 °F (36.8 °C)  Heart Rate:  [47-61] 53  BP: ()/(57-78) 95/63  SpO2:  [90 %-95 %] 95 %  on  Flow (L/min):  [2-3] 2;   Device (Oxygen Therapy): nasal cannula  Body mass index is 39.14 kg/m².    Physical Exam  Vitals reviewed.   Constitutional:       Appearance: Normal appearance. She is obese.   HENT:      Head: Normocephalic and atraumatic.      Right Ear: External ear normal.      Left Ear: External ear normal.      Nose: Nose normal.      Mouth/Throat:      Mouth: Mucous membranes are moist.   Eyes:      Extraocular Movements: Extraocular movements intact.   Cardiovascular:      Rate and Rhythm: Normal rate and regular rhythm.      Pulses: Normal pulses.      Heart sounds: Normal heart sounds.   Pulmonary:      Effort: Pulmonary effort is normal.      Breath sounds: Normal breath sounds.   Abdominal:      Palpations: Abdomen is soft.   Genitourinary:     Comments: deferred  Musculoskeletal:      Cervical back: Normal range of motion and neck supple.      Comments: Trace BLE edema   Skin:     General: Skin is warm and dry.   Neurological:      General: No focal deficit present.      Mental Status: She is alert and oriented to person, place, and time.   Psychiatric:         Mood and Affect: Mood normal.         Behavior: Behavior normal.         Thought Content: Thought content normal.         Judgment: Judgment normal.           Results Review:  I have personally reviewed most recent lab results and agree with findings, most notably: CBC CMP BNP and troponin.    Results from last 7 days   Lab Units 06/20/21  0850 06/19/21  1053   WBC 10*3/mm3 10.10 13.10*   HEMOGLOBIN g/dL 13.8  13.8   HEMATOCRIT % 41.2 40.3   PLATELETS 10*3/mm3 244 263   INR   --  1.07     Results from last 7 days   Lab Units 06/20/21  0850 06/19/21  1053   SODIUM mmol/L 137 134*   POTASSIUM mmol/L 4.1 3.9   CHLORIDE mmol/L 100 99   CO2 mmol/L 25.0 21.0*   BUN mg/dL 17 14   CREATININE mg/dL 1.02* 0.92   GLUCOSE mg/dL 134* 181*   CALCIUM mg/dL 8.5* 9.1   TROPONIN T ng/mL  --  0.188*   PROBNP pg/mL  --  6,849.0*     Estimated Creatinine Clearance: 46.3 mL/min (A) (by C-G formula based on SCr of 1.02 mg/dL (H)).  Brief Urine Lab Results     None          Microbiology Results (last 10 days)     Procedure Component Value - Date/Time    COVID PRE-OP / PRE-PROCEDURE SCREENING ORDER (NO ISOLATION) - Swab, Nasopharynx [[id number]]  (Normal) Collected: 06/19/21 1036    Lab Status: Final result Specimen: Swab from Nasopharynx Updated: 06/19/21 1154    Narrative:      The following orders were created for panel order COVID PRE-OP / PRE-PROCEDURE SCREENING ORDER (NO ISOLATION) - Swab, Nasopharynx.  Procedure                               Abnormality         Status                     ---------                               -----------         ------                     COVID-19,CEPHEID,COR/JEANE...[[id number]]  Normal              Final result                 Please view results for these tests on the individual orders.    COVID-19,CEPHEID,COR/JEANE/PAD/ZIA IN-HOUSE(OR EMERGENT/ADD-ON),NP SWAB IN TRANSPORT MEDIA 3-4 HR TAT, RT-PCR - Swab, Nasopharynx [[id number]]  (Normal) Collected: 06/19/21 1036    Lab Status: Final result Specimen: Swab from Nasopharynx Updated: 06/19/21 1154     COVID19 Not Detected    Narrative:      Fact sheet for providers: https://www.fda.gov/media/306901/download     Fact sheet for patients: https://www.fda.gov/media/935693/download  Fact sheet for providers: https://www.fda.gov/media/913119/download     Fact sheet for patients: https://www.fda.gov/media/470058/download          ECG/EMG Results (most recent)      Procedure Component Value Units Date/Time    ECG 12 Lead [[id number]] Resulted: 06/19/21 1449     Updated: 06/19/21 1449    ECG 12 Lead [[id number]] Collected: 06/19/21 2024     Updated: 06/20/21 0901     QT Interval 450 ms     Narrative:      HEART RATE= 54  bpm  RR Interval= 1120  ms  UT Interval= 197  ms  P Horizontal Axis= -20  deg  P Front Axis= 87  deg  QRSD Interval= 86  ms  QT Interval= 450  ms  QRS Axis= -56  deg  T Wave Axis= 67  deg  - ABNORMAL ECG -  Sinus bradycardia  Inferior infarct, old  Anterior infarct, old  When compared with ECG of 19-Jun-2021 10:42:21,  Significant rate decrease  Significant repolarization change  Electronically Signed By: Bennie Pace ([facility]) 20-Jun-2021 09:01:11  Date and Time of Study: 2021-06-19 20:24:33    Adult Transthoracic Echo Complete W/ Cont if Necessary Per Protocol [[id number]] Collected: 06/20/21 0927     Updated: 06/20/21 1024     BSA 2.1 m^2      RVIDd 2.7 cm      IVSd 1.1 cm      IVSs 1.2 cm      LVIDd 4.8 cm      LVIDs 4.1 cm      LVPWd 1.0 cm      BH CV ECHO SANJUANITA - LVPWS 1.0 cm      IVS/LVPW 1.1     FS 15.0 %      EDV(Teich) 108.0 ml      ESV(Teich) 73.7 ml      EF(Teich) 31.8 %      EDV(cubed) 111.3 ml      ESV(cubed) 68.3 ml      EF(cubed) 38.7 %      % IVS thick 4.7 %      % LVPW thick -4.1 %      LV mass(C)d 191.2 grams      LV mass(C)dI 92.4 grams/m^2      LV mass(C)s 148.8 grams      LV mass(C)sI 72.0 grams/m^2      SV(Teich) 34.4 ml      SI(Teich) 16.6 ml/m^2      SV(cubed) 43.0 ml      SI(cubed) 20.8 ml/m^2      Ao root diam 3.1 cm      Ao root area 7.4 cm^2      ACS 1.5 cm      asc Aorta Diam 2.8 cm      LVOT diam 2.1 cm      LVOT area 3.3 cm^2      EDV(MOD-sp4) 85.8 ml      ESV(MOD-sp4) 56.8 ml      EF(MOD-sp4) 33.8 %      SV(MOD-sp4) 29.0 ml      SI(MOD-sp4) 14.0 ml/m^2      Ao root area (BSA corrected) 1.5     LV Reyes Vol (BSA corrected) 41.5 ml/m^2      LV Sys Vol (BSA corrected) 27.5 ml/m^2      MV E max anshu 114.4 cm/sec      MV A max anshu 88.3 cm/sec       MV E/A 1.3     MV V2 max 117.9 cm/sec      MV max PG 5.6 mmHg      MV V2 mean 55.7 cm/sec      MV mean PG 1.5 mmHg      MV V2 VTI 34.9 cm      MVA(VTI) 2.3 cm^2      MV dec slope 520.4 cm/sec^2      MV dec time 0.22 sec      Ao pk anshu 171.2 cm/sec      Ao max PG 11.7 mmHg      Ao max PG (full) 5.9 mmHg      Ao V2 mean 126.1 cm/sec      Ao mean PG 6.8 mmHg      Ao mean PG (full) 3.6 mmHg      Ao V2 VTI 38.3 cm      KENNETH(I,A) 2.1 cm^2      KENNETH(I,D) 2.1 cm^2      KENNETH(V,A) 2.3 cm^2      KENNETH(V,D) 2.3 cm^2      LV V1 max PG 5.8 mmHg      LV V1 mean PG 3.3 mmHg      LV V1 max 120.7 cm/sec      LV V1 mean 82.8 cm/sec      LV V1 VTI 24.6 cm      SV(Ao) 283.9 ml      SI(Ao) 137.3 ml/m^2      SV(LVOT) 81.3 ml      SI(LVOT) 39.3 ml/m^2      PA V2 max 72.3 cm/sec      PA max PG 2.1 mmHg      PA max PG (full) 0.13 mmHg      PA V2 mean 54.1 cm/sec      PA mean PG 1.2 mmHg      PA mean PG (full) 0.26 mmHg      PA V2 VTI 17.6 cm      PA acc time 0.09 sec      RV V1 max PG 2.0 mmHg      RV V1 mean PG 0.98 mmHg      RV V1 max 70.0 cm/sec      RV V1 mean 46.0 cm/sec      RV V1 VTI 13.8 cm      TR max anshu 280.9 cm/sec      RVSP(TR) 39.6 mmHg      RAP systole 8.0 mmHg      PA pr(Accel) 40.1 mmHg       CV ECHO SANJUANITA - BZI_BMI 39.1 kilograms/m^2       CV ECHO SANJUANITA - BSA(HAYCOCK) 2.2 m^2       CV ECHO SANJUANITA - BZI_METRIC_WEIGHT 103.4 kg       CV ECHO SANJUANITA - BZI_METRIC_HEIGHT 162.6 cm      Target HR (85%) 114 bpm      Max. Pred. HR (100%) 134 bpm      EF(MOD-bp) 34.0 %      LA dimension(2D) 4.0 cm     ECG 12 Lead [[id number]] Collected: 06/19/21 1037     Updated: 06/20/21 1248     QT Interval 333 ms     Narrative:      HEART RATE= 104  bpm  RR Interval= 576  ms  UT Interval= 221  ms  P Horizontal Axis= -3  deg  P Front Axis= 54  deg  QRSD Interval= 85  ms  QT Interval= 333  ms  QRS Axis= -41  deg  T Wave Axis= 72  deg  - ABNORMAL ECG -  Sinus tachycardia  Prolonged UT interval  Inferior infarct, acute  Anterior infarct, acute  (LAD)  No previous ECG available for comparison  Electronically Signed By: Jona Funez ([facility]) 20-Jun-2021 12:47:57  Date and Time of Study: 2021-06-19 10:37:44          Results for orders placed during the hospital encounter of 06/19/21    Duplex Venous Lower Extremity - Bilateral CAR    Interpretation Summary  · Sub-acute right lower extremity superficial thrombophlebitis noted in the greater saphenous (below knee) and small saphenous.  · All other veins appeared normal bilaterally.          XR Chest 1 View    Result Date: 6/20/2021  Asymmetric interstitial and airspace opacities throughout the right lung with right perihilar soft tissue opacity. Recommend contrast-enhanced CT chest to exclude a mass.  Electronically Signed By-Gema Velez MD On:6/20/2021 10:01 AM This report was finalized on [id number] by  Gema Velez MD.    XR Chest 1 View    Result Date: 6/19/2021  1. Cardiomegaly with bilateral perihilar opacities, right worse than left. 2. Background pulmonary interstitial edema pattern with Kerley B-lines.  Electronically Signed By-Donaldo Martinez MD On:6/19/2021 4:42 PM This report was finalized on [id number] by  Donaldo Martinez MD.        Estimated Creatinine Clearance: 46.3 mL/min (A) (by C-G formula based on SCr of 1.02 mg/dL (H)).    Assessment/Plan   Assessment/Plan       Active Hospital Problems    Diagnosis  POA   • Dyspnea [R06.00]  Yes      Resolved Hospital Problems   No resolved problems to display.     Dyspnea/chest pain, recent STEMI with heart cath showing nonobstructive disease and severe left ventricular dysfunction EF 20 to 25% with echo pending likely secondary to stress-induced cardiomyopathy, cardiology following being diuresed with Lasix, on beta-blocker, lisinopril, tenuous cardiac monitoring, currently stable on 3 L oxygen via nasal cannula, may need 3-minute walk test for oxygen at home upon discharge on aspirin    Leukocytosis improving 13.1 may have been reactive now  10.0 urinalysis ordered and pending, on p.o. doxycycline per pulmonary, chest x-ray shows opacities per report, CT to rule out mass (defer to pulmonary)    Acute right lower extremity superficial thrombophlebitis, on heparin VTE    Mild nonfasting hyperglycemia was 181 now 134, A1c in a.m.    Mild acute renal insufficiency creatinine 1.2 likely secondary to diuresis continue to trend renal function    Hypocalcemia, ionized calcium in a.m.    Chronic hypertension, on ice Enbrel, beta-blocker and Lasix monitor BP    Obesity BMI 39.14 lifestyle management education    Overactive bladder, reorder home oxybutynin    VTE Prophylaxis -   Mechanical Order History:     None      Pharmalogical Order History:      Ordered     Dose Route Frequency Stop    06/19/21 1047  heparin (porcine) 5000 UNIT/ML injection      -- IV Code / Trauma / Sedation Medication 06/19/21 1047    06/19/21 1043  heparin (porcine) 1000 UNIT/ML injection  - ADS Override Pull     Note to Pharmacy: Created by cabinet override    -- -- -- 06/19/21 1224                CODE STATUS:    Code Status and Medical Interventions:   Ordered at: 06/20/21 0052     Level Of Support Discussed With:    Patient     Code Status:    CPR     Medical Interventions (Level of Support Prior to Arrest):    Full       This patient has been examined wearing appropriate Personal Protective Equipment and     I discussed the patient's findings and my recommendations with patient.      Signature:Electronically signed by SANDRA Huddleston, 06/20/21, 10:06 PM EDT.    [facility] Hospitalist Team

## 2021-06-21 NOTE — PROGRESS NOTES
"PULMONARY CRITICAL CARE Progress  NOTE      PATIENT IDENTIFICATION:  Name: Suzette Blandon  MRN: [id number]  :  1934     Age: 86 y.o.  Sex: female    DATE OF Note:  2021   Referring Physician: Liz Deleon MD                  Subjective:   Feeling better, less SOB, still on O2  No chest or abd pain, no B & B issues   No new complaints voiced       Objective:  tMax 24 hrs: Temp (24hrs), Av.1 °F (36.7 °C), Min:97.4 °F (36.3 °C), Max:98.7 °F (37.1 °C)      Vitals Ranges:   Temp:  [97.4 °F (36.3 °C)-98.7 °F (37.1 °C)] 98.7 °F (37.1 °C)  Heart Rate:  [53-81] 54  Resp:  [18-22] 20  BP: ()/(50-93) 130/57    Intake and Output Last 3 Shifts:   I/O last 3 completed shifts:  In: 1712.5 [P.O.:1505; I.V.:207.5]  Out: 3845 [Urine:3845]    Exam:  /57   Pulse 54   Temp 98.7 °F (37.1 °C) (Oral)   Resp 20   Ht 162.6 cm (64\")   Wt 101 kg (223 lb 8.7 oz)   SpO2 97%   BMI 38.37 kg/m²     General Appearance: Alert    HEENT:  Normocephalic, without obvious abnormality, Conjunctiva/corneas clear,.  Normal external ear canals, Nares normal, no drainage     Neck:  Supple, symmetrical, trachea midline. No JVD.  Lungs /Chest wall:   Bilateral basal rhonchi improved , respirations unlabored symmetrical wall movement.     Heart:  Regular rate and rhythm, systolic murmur PMI left sternal border  Abdomen: Soft, non-tender, no masses, no organomegaly.    Extremities: Trace edema no clubbing or Cyanosis        Medications:    Current Facility-Administered Medications:   •  acetaminophen (TYLENOL) tablet 650 mg, 650 mg, Oral, Q4H PRN, Bennie Pace MD  •  aspirin chewable tablet 81 mg, 81 mg, Oral, Daily, Bennie Pace MD, 81 mg at 21 0957  •  atropine sulfate injection 0.5 mg, 0.5 mg, Intravenous, Q5 Min PRN, Bennie Pace MD  •  dextrose (D50W) 25 g/ 50mL Intravenous Solution 25 g, 25 g, Intravenous, Q15 Min PRN, Vale Miller MD  •  dextrose (GLUTOSE) oral gel 15 g, 15 g, Oral, Q15 Min PRN, Paul, " MD Vale  •  doxycycline (ADOXA) tablet 100 mg, 100 mg, Oral, Q12H, Draw, MD Liz, 100 mg at 06/21/21 0957  •  furosemide (LASIX) tablet 20 mg, 20 mg, Oral, Q8H, Day, SANDRA Kearney, 20 mg at 06/21/21 1307  •  glucagon (human recombinant) (GLUCAGEN DIAGNOSTIC) injection 1 mg, 1 mg, Subcutaneous, Q15 Min PRN, Vale Miller MD  •  heparin (porcine) 5000 UNIT/ML injection 5,000 Units, 5,000 Units, Subcutaneous, Q8H, Vale Miller MD  •  insulin lispro (ADMELOG) injection 0-7 Units, 0-7 Units, Subcutaneous, TID AC **AND** insulin lispro (ADMELOG) injection 0-7 Units, 0-7 Units, Subcutaneous, PRN, Vale Miller MD  •  lisinopril (PRINIVIL,ZESTRIL) tablet 40 mg, 40 mg, Oral, Daily, Bennie Pace MD, 40 mg at 06/21/21 0957  •  metoprolol tartrate (LOPRESSOR) tablet 25 mg, 25 mg, Oral, Q12H, Bennie Pace MD, 25 mg at 06/21/21 0957  •  oxybutynin (DITROPAN) tablet 5 mg, 5 mg, Oral, BID, Bennie Pace MD, 5 mg at 06/21/21 0957  •  pantoprazole (PROTONIX) EC tablet 40 mg, 40 mg, Oral, Q AM, Gunjan Recio APRN, 40 mg at 06/21/21 0602  •  polyethylene glycol (MIRALAX) packet 17 g, 17 g, Oral, Daily, DrawLiz MD, 17 g at 06/21/21 0957  •  [COMPLETED] Insert peripheral IV, , , Once **AND** sodium chloride 0.9 % flush 10 mL, 10 mL, Intravenous, PRN, Bennie Pace MD, 10 mL at 06/20/21 2055  •  sodium chloride 0.9 % infusion 250 mL, 250 mL, Intravenous, Once PRN, Bennie Pace MD    Data Review:  All labs (24hrs):   Recent Results (from the past 24 hour(s))   Hemoglobin A1c    Collection Time: 06/21/21  3:25 AM    Specimen: Blood   Result Value Ref Range    Hemoglobin A1C 6.5 (H) 3.5 - 5.6 %   Lipid Panel    Collection Time: 06/21/21  3:25 AM    Specimen: Blood   Result Value Ref Range    Total Cholesterol 199 0 - 200 mg/dL    Triglycerides 134 0 - 150 mg/dL    HDL Cholesterol 48 40 - 60 mg/dL    LDL Cholesterol  127 (H) 0 - 100 mg/dL    VLDL Cholesterol 24 5 - 40 mg/dL    LDL/HDL Ratio 2.59    Calcium,  Ionized    Collection Time: 21  3:25 AM    Specimen: Blood   Result Value Ref Range    Ionized Calcium 1.18 (L) 1.20 - 1.30 mmol/L   Basic Metabolic Panel    Collection Time: 21  3:25 AM    Specimen: Blood   Result Value Ref Range    Glucose 129 (H) 65 - 99 mg/dL    BUN 26 (H) 8 - 23 mg/dL    Creatinine 1.05 (H) 0.57 - 1.00 mg/dL    Sodium 139 136 - 145 mmol/L    Potassium 4.2 3.5 - 5.2 mmol/L    Chloride 101 98 - 107 mmol/L    CO2 27.0 22.0 - 29.0 mmol/L    Calcium 8.5 (L) 8.6 - 10.5 mg/dL    eGFR Non African Amer 50 (L) >60 mL/min/1.73    BUN/Creatinine Ratio 24.8 7.0 - 25.0    Anion Gap 11.0 5.0 - 15.0 mmol/L   CBC Auto Differential    Collection Time: 21  3:25 AM    Specimen: Blood   Result Value Ref Range    WBC 10.70 3.40 - 10.80 10*3/mm3    RBC 3.97 3.77 - 5.28 10*6/mm3    Hemoglobin 12.4 12.0 - 15.9 g/dL    Hematocrit 37.1 34.0 - 46.6 %    MCV 93.5 79.0 - 97.0 fL    MCH 31.3 26.6 - 33.0 pg    MCHC 33.5 31.5 - 35.7 g/dL    RDW 13.5 12.3 - 15.4 %    RDW-SD 44.2 37.0 - 54.0 fl    MPV 9.3 6.0 - 12.0 fL    Platelets 265 140 - 450 10*3/mm3    Neutrophil % 69.3 42.7 - 76.0 %    Lymphocyte % 14.8 (L) 19.6 - 45.3 %    Monocyte % 13.0 (H) 5.0 - 12.0 %    Eosinophil % 2.4 0.3 - 6.2 %    Basophil % 0.5 0.0 - 1.5 %    Neutrophils, Absolute 7.40 (H) 1.70 - 7.00 10*3/mm3    Lymphocytes, Absolute 1.60 0.70 - 3.10 10*3/mm3    Monocytes, Absolute 1.40 (H) 0.10 - 0.90 10*3/mm3    Eosinophils, Absolute 0.30 0.00 - 0.40 10*3/mm3    Basophils, Absolute 0.10 0.00 - 0.20 10*3/mm3    nRBC 0.0 0.0 - 0.2 /100 WBC        Imaging:  Cardiac Catheterization/Vascular Study  Heart Cath Report    NAME:              Suzette Blandon  :                1934  AGE/SEX:        86 y.o. female  MRN:                [id number]  ADM DATE:      [unfilled]  DOS:                     Pre-Procedure Notes  H&P Performed  [x]  Yes []  No       []  N/A    Indications:   [x]  ACS <= 24 HRS   []  ACS >24 HRS   [x]  New Onset Angina <= 2  mos   []  Worsening Angina   []  Resuscitated Cardiac Arrest   []  Angina on Exertion:   []  Suspected CAD   []  Valvular Disease   []  Pericardial Disease   []  Cardiac Arrythmia   []  Cardiomyopathy   [x]  LV Dysfunction   []  Syncope   []  Post Cardiac Transplant   []  Eval. For Exercise Clearance   []  Abnormal Stress Test    []  Other   []  Pre-Operative Evaluation        If Pre-Op Eval:   Evaluation for Surgery Type:  []  Cardiac Surgery   []  Non-Cardiac   Surgery   Functional Capacity:  []  <4 METS  []  >=4 METS w/o symptoms          []  >= 4 METS with symptoms  []  Unknown   Surgical Risk:  []  Low  []  Intermediate         []  High Risk: Vascular  []  High Risk Non-Vascular    Risks, Benefits, & Complications Discussed:  [x]  Yes  []  No  []  N/A    Questions Answered:  [x]  Yes  []  No  []  N/A    Consent Obtained:  [x]  Yes  []  No  []  N/A    CHF: [x]  Yes  []  No     If Yes:  Newly Diagnosed?  [x]  Yes  []  No   If Yes:  HF Type:  []  Diastolic  [x]  Systolic  []  Unknown     Procedure Description  The patient underwent successful [x]  Left  []  Right  []  Left & Right    Heart catheterization and coronary angiography via the   [x]  Femoral approach  []  Radial approach  []  Brachial approach    Procedure Narrative:   Informed consent was obtained from the patient after explaining risks and   benefits.  Patient was brought to the cardiac catheterization laboratory   and placed on the table in the usual fashion.  Right groin was shaved and   prepped in sterile fashion. Moderate conscious sedation was given   utilizing IV Versed and fentanyl administered by RN with continuous EKG   oximetry and hemodynamic monitoring supervised by me throughout the entire   case, conscious sedation time was 30 minutes.  I was present with the   patient for the duration of moderate sedation and supervised staff who had   no other duties and monitored the patient for the entire procedure patient   had Hunt 2-3 sedation  scale. 2% lidocaine was used for local anesthesia   to the right groin.  A total of 20 cc was used.  A 6 Croatian sheath was   placed in the right femoral artery.  A 6 Croatian pigtail catheter, 6 Croatian   JR4 catheter and a 6 Croatian JL4 catheter was used for the procedure.    After the cardiac catheterization is complete, all the catheters and   sheath were removed.  Patient tolerated the procedure very well.  No   complications noted.    Hemodynamic    LVEDP:12-16   Estimated EF %: 20 to 25%      Initial Aortic Pressure: 130/80    AV Gradient: No gradient    Rt. Heart Pressure:    Wall Motion:      Dominance:  []  Left  []  Right  [x]  Co-Dominant      Coronary Arteriography: (Please Code highest degree of stenosis)    Left Main %:   0  Proximal LAD %:  0  Mid/Distal LAD %:  0.  50 to 60% diagonal disease  LCX %: 30%  Ramus:    RCA %: 0   Lima %:    SVG(s) %:       Complications: No complications    Estimated Blood Loss:  None    Impression and Recommendation: Nonobstructive coronary disease  Severe LV dysfunction consistent with stress-induced cardiomyopathy    Electronically signed by Bennie Pace MD, 06/19/21, 3:36 PM EDT       ASSESSMENT:  ACS  Cardiomyopathy  HTN  Hyperlipidemia  GERD         PLAN:  Antibiotics-Doxycycline  Diuretics   O2 support to main sats 86% and above  Electrolytes/ glycemic control  DVT and GI prophylaxis    Discussed with SANDRA Louie   6/21/2021  15:34 EDT     I personally have examined  and interviewed the patient. I have reviewed the history, data, problems, assessment and plan with our NP.  Critical care time in direct medical management (   ) minutes  Electronically signed by Saniya Mayers MD D,ABS, 06/21/21, 5:31 PM EDT.

## 2021-06-21 NOTE — CONSULTS
Cardiac rehab evaluation. Heart failure. EF 20-25%. Brochure given. Explained program and benefits. Transportation may be an issue. Okay to call following discharge.

## 2021-06-21 NOTE — PROGRESS NOTES
"Referring Provider: Intensivist    Reason for follow-up: Acute coronary syndrome and congestive heart failure     Patient Care Team:  Bartolo Meng as PCP - General (Family Medicine)    Subjective .  Patient is feeling better today with less shortness of breath    Objective  Lying in bed comfortably     Review of Systems   Constitutional: Negative for fever and malaise/fatigue.   HENT: Negative for ear pain and nosebleeds.    Eyes: Negative for blurred vision and double vision.   Cardiovascular: Negative for chest pain, dyspnea on exertion and palpitations.   Respiratory: Positive for shortness of breath. Negative for cough.    Skin: Negative for rash.   Musculoskeletal: Negative for joint pain.   Gastrointestinal: Negative for abdominal pain, nausea and vomiting.   Neurological: Negative for focal weakness and headaches.   Psychiatric/Behavioral: Negative for depression. The patient is not nervous/anxious.    All other systems reviewed and are negative.      Oxycodone, Penicillins, and Polyoxyethylene lauryl ether [sorbitan]    Scheduled Meds:aspirin, 81 mg, Oral, Daily  doxycycline, 100 mg, Oral, Q12H  furosemide, 20 mg, Oral, Q8H  lisinopril, 40 mg, Oral, Daily  metoprolol tartrate, 25 mg, Oral, Q12H  oxybutynin, 5 mg, Oral, BID  pantoprazole, 40 mg, Oral, Q AM  polyethylene glycol, 17 g, Oral, Daily      Continuous Infusions:   PRN Meds:.•  acetaminophen  •  atropine  •  [COMPLETED] Insert peripheral IV **AND** sodium chloride  •  sodium chloride        VITAL SIGNS  Vitals:    06/20/21 2312 06/21/21 0139 06/21/21 0606 06/21/21 1100   BP: 140/93 139/62 141/70 130/50   BP Location: Left arm Left arm Right arm    Patient Position: Lying Lying Lying    Pulse: 68 71 71 60   Resp: 18 18 19 22   Temp: 97.4 °F (36.3 °C) 97.7 °F (36.5 °C) 98.7 °F (37.1 °C) 97.5 °F (36.4 °C)   TempSrc: Oral Oral Oral Oral   SpO2: 94% 94% 93% 92%   Weight: 101 kg (223 lb 8.7 oz)      Height: 162.6 cm (64\")          Flowsheet Rows      " "First Filed Value   Admission Height  157.5 cm (62\") Documented at 06/19/2021 1027   Admission Weight  102 kg (225 lb) Documented at 06/19/2021 1027           TELEMETRY: Sinus rhythm    Physical Exam:  Constitutional:       Appearance: Well-developed.   Eyes:      General: No scleral icterus.     Conjunctiva/sclera: Conjunctivae normal.      Pupils: Pupils are equal, round, and reactive to light.   HENT:      Head: Normocephalic and atraumatic.   Neck:      Vascular: No carotid bruit or JVD.   Pulmonary:      Effort: Pulmonary effort is normal.      Breath sounds: Normal breath sounds. No wheezing. No rales.   Cardiovascular:      Normal rate. Regular rhythm.      Murmurs: There is a systolic murmur.   Pulses:     Intact distal pulses.   Abdominal:      General: Bowel sounds are normal.      Palpations: Abdomen is soft.   Musculoskeletal: Normal range of motion.      Cervical back: Normal range of motion and neck supple. Skin:     General: Skin is warm and dry.      Findings: No rash.   Neurological:      Mental Status: Alert.      Comments: No focal deficits          Results Review:   I reviewed the patient's new clinical results.  Lab Results (last 24 hours)     Procedure Component Value Units Date/Time    Hemoglobin A1c [[id number]]  (Abnormal) Collected: 06/21/21 0325    Specimen: Blood Updated: 06/21/21 1006     Hemoglobin A1C 6.5 %     Narrative:      Hemoglobin A1C Reference Range:    <5.7 %        Normal  5.7-6.4 %     Increased risk for diabetes  > 6.4 %        Diabetes       These guidelines have been recommended by the American Diabetic Association for Hgb A1c.      The following 2010 guidelines have been recommended by the American Diabetes Association for Hemoglobin A1c.    HBA1c 5.7-6.4% Increased risk for future diabetes (pre-diabetes)  HBA1c     >6.4% Diabetes      Basic Metabolic Panel [[id number]]  (Abnormal) Collected: 06/21/21 0325    Specimen: Blood Updated: 06/21/21 0403     Glucose 129 mg/dL     "  BUN 26 mg/dL      Creatinine 1.05 mg/dL      Sodium 139 mmol/L      Potassium 4.2 mmol/L      Chloride 101 mmol/L      CO2 27.0 mmol/L      Calcium 8.5 mg/dL      eGFR Non African Amer 50 mL/min/1.73      BUN/Creatinine Ratio 24.8     Anion Gap 11.0 mmol/L     Narrative:      GFR Normal >60  Chronic Kidney Disease <60  Kidney Failure <15      Lipid Panel [[id number]]  (Abnormal) Collected: 06/21/21 0325    Specimen: Blood Updated: 06/21/21 0359     Total Cholesterol 199 mg/dL      Triglycerides 134 mg/dL      HDL Cholesterol 48 mg/dL      LDL Cholesterol  127 mg/dL      VLDL Cholesterol 24 mg/dL      LDL/HDL Ratio 2.59    Narrative:      Cholesterol Reference Ranges  (U.S. Department of Health and Human Services ATP III Classifications)    Desirable          <200 mg/dL  Borderline High    200-239 mg/dL  High Risk          >240 mg/dL      Triglyceride Reference Ranges  (U.S. Department of Health and Human Services ATP III Classifications)    Normal           <150 mg/dL  Borderline High  150-199 mg/dL  High             200-499 mg/dL  Very High        >500 mg/dL    HDL Reference Ranges  (U.S. Department of Health and Human Services ATP III Classifcations)    Low     <40 mg/dl (major risk factor for CHD)  High    >60 mg/dl ('negative' risk factor for CHD)        LDL Reference Ranges  (U.S. Department of Health and Human Services ATP III Classifcations)    Optimal          <100 mg/dL  Near Optimal     100-129 mg/dL  Borderline High  130-159 mg/dL  High             160-189 mg/dL  Very High        >189 mg/dL    Calcium, Ionized [[id number]]  (Abnormal) Collected: 06/21/21 0325    Specimen: Blood Updated: 06/21/21 0350     Ionized Calcium 1.18 mmol/L     CBC & Differential [[id number]]  (Abnormal) Collected: 06/21/21 0325    Specimen: Blood Updated: 06/21/21 0335    Narrative:      The following orders were created for panel order CBC & Differential.  Procedure                               Abnormality         Status                      ---------                               -----------         ------                     CBC Auto Differential[[id number]]        Abnormal            Final result                 Please view results for these tests on the individual orders.    CBC Auto Differential [[id number]]  (Abnormal) Collected: 06/21/21 0325    Specimen: Blood Updated: 06/21/21 0335     WBC 10.70 10*3/mm3      RBC 3.97 10*6/mm3      Hemoglobin 12.4 g/dL      Hematocrit 37.1 %      MCV 93.5 fL      MCH 31.3 pg      MCHC 33.5 g/dL      RDW 13.5 %      RDW-SD 44.2 fl      MPV 9.3 fL      Platelets 265 10*3/mm3      Neutrophil % 69.3 %      Lymphocyte % 14.8 %      Monocyte % 13.0 %      Eosinophil % 2.4 %      Basophil % 0.5 %      Neutrophils, Absolute 7.40 10*3/mm3      Lymphocytes, Absolute 1.60 10*3/mm3      Monocytes, Absolute 1.40 10*3/mm3      Eosinophils, Absolute 0.30 10*3/mm3      Basophils, Absolute 0.10 10*3/mm3      nRBC 0.0 /100 WBC           Imaging Results (Last 24 Hours)     ** No results found for the last 24 hours. **          EKG      I personally viewed and interpreted the patient's EKG/Telemetry data:    ECHOCARDIOGRAM:    STRESS MYOVIEW:    CARDIAC CATHETERIZATION:    OTHER:         Assessment/Plan     Active Problems:    Dyspnea  Acute coronary syndrome  Congestive heart failure with Takotsubo cardiomyopathy  Hypertension  Hyperlipidemia  Mild renal insufficiency    Patient presented with chest pain or shortness of breath and had ST segment abnormalities in anterolateral leads  Patient underwent cardiac catheterization which showed nonobstructive disease but has significant congestive heart failure consistent with Takotsubo cardiomyopathy  Patient is started on beta-blockers along with her home dose of ACE inhibitor's  Blood pressure and heart rate stable  Patient had an echocardiogram which showed LV dysfunction with a EF of about 30%  Patient has mild renal insufficiency and will be monitored closely because she  is using diuretics  Patient's electrolyte levels will be monitored carefully    I discussed the patients findings and my recommendations with patient and nurse    Bennie Pace MD  06/21/21  13:13 EDT

## 2021-06-22 VITALS
TEMPERATURE: 97.8 F | HEIGHT: 64 IN | HEART RATE: 50 BPM | BODY MASS INDEX: 37.9 KG/M2 | DIASTOLIC BLOOD PRESSURE: 36 MMHG | WEIGHT: 222 LBS | RESPIRATION RATE: 20 BRPM | OXYGEN SATURATION: 92 % | SYSTOLIC BLOOD PRESSURE: 115 MMHG

## 2021-06-22 LAB
ANION GAP SERPL CALCULATED.3IONS-SCNC: 10 MMOL/L (ref 5–15)
BASOPHILS # BLD AUTO: 0 10*3/MM3 (ref 0–0.2)
BASOPHILS NFR BLD AUTO: 0.5 % (ref 0–1.5)
BUN SERPL-MCNC: 27 MG/DL (ref 8–23)
BUN/CREAT SERPL: 23.5 (ref 7–25)
CALCIUM SPEC-SCNC: 9.3 MG/DL (ref 8.6–10.5)
CHLORIDE SERPL-SCNC: 98 MMOL/L (ref 98–107)
CO2 SERPL-SCNC: 31 MMOL/L (ref 22–29)
CREAT SERPL-MCNC: 1.15 MG/DL (ref 0.57–1)
DEPRECATED RDW RBC AUTO: 42.9 FL (ref 37–54)
EOSINOPHIL # BLD AUTO: 0.3 10*3/MM3 (ref 0–0.4)
EOSINOPHIL NFR BLD AUTO: 3.6 % (ref 0.3–6.2)
ERYTHROCYTE [DISTWIDTH] IN BLOOD BY AUTOMATED COUNT: 13.3 % (ref 12.3–15.4)
GFR SERPL CREATININE-BSD FRML MDRD: 45 ML/MIN/1.73
GLUCOSE BLDC GLUCOMTR-MCNC: 134 MG/DL (ref 70–105)
GLUCOSE BLDC GLUCOMTR-MCNC: 182 MG/DL (ref 70–105)
GLUCOSE SERPL-MCNC: 128 MG/DL (ref 65–99)
HCT VFR BLD AUTO: 41.1 % (ref 34–46.6)
HGB BLD-MCNC: 13.8 G/DL (ref 12–15.9)
LYMPHOCYTES # BLD AUTO: 1.3 10*3/MM3 (ref 0.7–3.1)
LYMPHOCYTES NFR BLD AUTO: 17.2 % (ref 19.6–45.3)
MAGNESIUM SERPL-MCNC: 2 MG/DL (ref 1.6–2.4)
MCH RBC QN AUTO: 31.4 PG (ref 26.6–33)
MCHC RBC AUTO-ENTMCNC: 33.5 G/DL (ref 31.5–35.7)
MCV RBC AUTO: 93.8 FL (ref 79–97)
MONOCYTES # BLD AUTO: 0.9 10*3/MM3 (ref 0.1–0.9)
MONOCYTES NFR BLD AUTO: 11.9 % (ref 5–12)
NEUTROPHILS NFR BLD AUTO: 5.1 10*3/MM3 (ref 1.7–7)
NEUTROPHILS NFR BLD AUTO: 66.8 % (ref 42.7–76)
NRBC BLD AUTO-RTO: 0 /100 WBC (ref 0–0.2)
PLATELET # BLD AUTO: 295 10*3/MM3 (ref 140–450)
PMV BLD AUTO: 9.2 FL (ref 6–12)
POTASSIUM SERPL-SCNC: 4 MMOL/L (ref 3.5–5.2)
RBC # BLD AUTO: 4.39 10*6/MM3 (ref 3.77–5.28)
SODIUM SERPL-SCNC: 139 MMOL/L (ref 136–145)
WBC # BLD AUTO: 7.7 10*3/MM3 (ref 3.4–10.8)

## 2021-06-22 PROCEDURE — 82962 GLUCOSE BLOOD TEST: CPT

## 2021-06-22 PROCEDURE — 85025 COMPLETE CBC W/AUTO DIFF WBC: CPT | Performed by: HOSPITALIST

## 2021-06-22 PROCEDURE — 80048 BASIC METABOLIC PNL TOTAL CA: CPT | Performed by: HOSPITALIST

## 2021-06-22 PROCEDURE — 99239 HOSP IP/OBS DSCHRG MGMT >30: CPT | Performed by: HOSPITALIST

## 2021-06-22 PROCEDURE — 99232 SBSQ HOSP IP/OBS MODERATE 35: CPT | Performed by: INTERNAL MEDICINE

## 2021-06-22 PROCEDURE — 25010000002 HEPARIN (PORCINE) PER 1000 UNITS: Performed by: HOSPITALIST

## 2021-06-22 PROCEDURE — 83735 ASSAY OF MAGNESIUM: CPT | Performed by: HOSPITALIST

## 2021-06-22 PROCEDURE — 94618 PULMONARY STRESS TESTING: CPT

## 2021-06-22 RX ORDER — FUROSEMIDE 20 MG/1
20 TABLET ORAL EVERY 8 HOURS
Qty: 20 TABLET | Refills: 0 | Status: SHIPPED | OUTPATIENT
Start: 2021-06-22 | End: 2021-06-22

## 2021-06-22 RX ORDER — DOXYCYCLINE 100 MG/1
100 TABLET ORAL EVERY 12 HOURS SCHEDULED
Qty: 10 TABLET | Refills: 0 | Status: ON HOLD | OUTPATIENT
Start: 2021-06-22 | End: 2021-06-28

## 2021-06-22 RX ORDER — POLYETHYLENE GLYCOL 3350 17 G/17G
17 POWDER, FOR SOLUTION ORAL DAILY
Qty: 510 G | Refills: 0 | Status: SHIPPED | OUTPATIENT
Start: 2021-06-23 | End: 2021-10-06

## 2021-06-22 RX ORDER — FUROSEMIDE 20 MG/1
20 TABLET ORAL DAILY
Qty: 20 TABLET | Refills: 0 | Status: ON HOLD | OUTPATIENT
Start: 2021-06-22 | End: 2021-06-28 | Stop reason: SDUPTHER

## 2021-06-22 RX ORDER — BISACODYL 5 MG/1
10 TABLET, DELAYED RELEASE ORAL DAILY PRN
Status: DISCONTINUED | OUTPATIENT
Start: 2021-06-22 | End: 2021-06-22 | Stop reason: HOSPADM

## 2021-06-22 RX ADMIN — METOPROLOL TARTRATE 25 MG: 25 TABLET, FILM COATED ORAL at 08:26

## 2021-06-22 RX ADMIN — Medication 10 ML: at 08:27

## 2021-06-22 RX ADMIN — POLYETHYLENE GLYCOL 3350 17 G: 17 POWDER, FOR SOLUTION ORAL at 08:26

## 2021-06-22 RX ADMIN — OXYBUTYNIN CHLORIDE 5 MG: 5 TABLET ORAL at 08:26

## 2021-06-22 RX ADMIN — HEPARIN SODIUM 5000 UNITS: 5000 INJECTION INTRAVENOUS; SUBCUTANEOUS at 06:10

## 2021-06-22 RX ADMIN — PANTOPRAZOLE SODIUM 40 MG: 40 TABLET, DELAYED RELEASE ORAL at 06:10

## 2021-06-22 RX ADMIN — DOXYCYCLINE 100 MG: 100 TABLET, FILM COATED ORAL at 08:27

## 2021-06-22 RX ADMIN — ASPIRIN 81 MG CHEWABLE TABLET 81 MG: 81 TABLET CHEWABLE at 08:26

## 2021-06-22 RX ADMIN — LISINOPRIL 40 MG: 20 TABLET ORAL at 08:26

## 2021-06-22 RX ADMIN — BISACODYL 10 MG: 5 TABLET, COATED ORAL at 09:41

## 2021-06-22 RX ADMIN — FUROSEMIDE 20 MG: 20 TABLET ORAL at 06:10

## 2021-06-22 NOTE — PROGRESS NOTES
Referring Provider: Intensivist    Reason for follow-up: Acute coronary syndrome and congestive heart failure     Patient Care Team:  Bartolo Meng as PCP - General (Family Medicine)    Subjective .  Patient is feeling better today with less shortness of breath    Objective  Lying in bed comfortably     Review of Systems   Constitutional: Negative for fever and malaise/fatigue.   HENT: Negative for ear pain and nosebleeds.    Eyes: Negative for blurred vision and double vision.   Cardiovascular: Negative for chest pain, dyspnea on exertion and palpitations.   Respiratory: Positive for shortness of breath. Negative for cough.    Skin: Negative for rash.   Musculoskeletal: Negative for joint pain.   Gastrointestinal: Negative for abdominal pain, nausea and vomiting.   Neurological: Negative for focal weakness and headaches.   Psychiatric/Behavioral: Negative for depression. The patient is not nervous/anxious.    All other systems reviewed and are negative.      Oxycodone, Penicillins, and Polyoxyethylene lauryl ether [sorbitan]    Scheduled Meds:aspirin, 81 mg, Oral, Daily  doxycycline, 100 mg, Oral, Q12H  furosemide, 20 mg, Oral, Q8H  heparin (porcine), 5,000 Units, Subcutaneous, Q8H  insulin lispro, 0-7 Units, Subcutaneous, TID AC  lisinopril, 40 mg, Oral, Daily  metoprolol tartrate, 25 mg, Oral, Q12H  oxybutynin, 5 mg, Oral, BID  pantoprazole, 40 mg, Oral, Q AM  polyethylene glycol, 17 g, Oral, Daily      Continuous Infusions:   PRN Meds:.•  acetaminophen  •  atropine  •  bisacodyl  •  dextrose  •  dextrose  •  glucagon (human recombinant)  •  insulin lispro **AND** insulin lispro  •  [COMPLETED] Insert peripheral IV **AND** sodium chloride  •  sodium chloride        VITAL SIGNS  Vitals:    06/21/21 2230 06/22/21 0244 06/22/21 0520 06/22/21 1100   BP: 140/66 107/67 123/65 (!) 115/36   BP Location: Right arm Left arm Left arm    Patient Position: Lying Lying Lying    Pulse: 59 52 50 50   Resp: 22 20 21 20   Temp:  "97.8 °F (36.6 °C) 97.6 °F (36.4 °C) 97.8 °F (36.6 °C) 97.8 °F (36.6 °C)   TempSrc: Oral Oral Oral Oral   SpO2: 96% 99% 98% 92%   Weight:   101 kg (222 lb 0.1 oz)    Height:           Flowsheet Rows      First Filed Value   Admission Height  157.5 cm (62\") Documented at 06/19/2021 1027   Admission Weight  102 kg (225 lb) Documented at 06/19/2021 1027           TELEMETRY: Sinus rhythm    Physical Exam:  Constitutional:       Appearance: Well-developed.   Eyes:      General: No scleral icterus.     Conjunctiva/sclera: Conjunctivae normal.      Pupils: Pupils are equal, round, and reactive to light.   HENT:      Head: Normocephalic and atraumatic.   Neck:      Vascular: No carotid bruit or JVD.   Pulmonary:      Effort: Pulmonary effort is normal.      Breath sounds: Normal breath sounds. No wheezing. No rales.   Cardiovascular:      Normal rate. Regular rhythm.      Murmurs: There is a systolic murmur.   Pulses:     Intact distal pulses.   Abdominal:      General: Bowel sounds are normal.      Palpations: Abdomen is soft.   Musculoskeletal: Normal range of motion.      Cervical back: Normal range of motion and neck supple. Skin:     General: Skin is warm and dry.      Findings: No rash.   Neurological:      Mental Status: Alert.      Comments: No focal deficits          Results Review:   I reviewed the patient's new clinical results.  Lab Results (last 24 hours)     Procedure Component Value Units Date/Time    POC Glucose Once [[id number]]  (Abnormal) Collected: 06/22/21 1157    Specimen: Blood Updated: 06/22/21 1158     Glucose 182 mg/dL      Comment: Serial Number: [id number]:  [id number]       Basic Metabolic Panel [[id number]]  (Abnormal) Collected: 06/22/21 0846    Specimen: Blood Updated: 06/22/21 0917     Glucose 128 mg/dL      BUN 27 mg/dL      Creatinine 1.15 mg/dL      Sodium 139 mmol/L      Potassium 4.0 mmol/L      Chloride 98 mmol/L      CO2 31.0 mmol/L      Calcium 9.3 mg/dL      eGFR Non African Amer " 45 mL/min/1.73      BUN/Creatinine Ratio 23.5     Anion Gap 10.0 mmol/L     Narrative:      GFR Normal >60  Chronic Kidney Disease <60  Kidney Failure <15      Magnesium [[id number]]  (Normal) Collected: 06/22/21 0846    Specimen: Blood Updated: 06/22/21 0917     Magnesium 2.0 mg/dL     CBC & Differential [[id number]]  (Abnormal) Collected: 06/22/21 0846    Specimen: Blood Updated: 06/22/21 0857    Narrative:      The following orders were created for panel order CBC & Differential.  Procedure                               Abnormality         Status                     ---------                               -----------         ------                     CBC Auto Differential[[id number]]        Abnormal            Final result                 Please view results for these tests on the individual orders.    CBC Auto Differential [[id number]]  (Abnormal) Collected: 06/22/21 0846    Specimen: Blood Updated: 06/22/21 0857     WBC 7.70 10*3/mm3      RBC 4.39 10*6/mm3      Hemoglobin 13.8 g/dL      Hematocrit 41.1 %      MCV 93.8 fL      MCH 31.4 pg      MCHC 33.5 g/dL      RDW 13.3 %      RDW-SD 42.9 fl      MPV 9.2 fL      Platelets 295 10*3/mm3      Neutrophil % 66.8 %      Lymphocyte % 17.2 %      Monocyte % 11.9 %      Eosinophil % 3.6 %      Basophil % 0.5 %      Neutrophils, Absolute 5.10 10*3/mm3      Lymphocytes, Absolute 1.30 10*3/mm3      Monocytes, Absolute 0.90 10*3/mm3      Eosinophils, Absolute 0.30 10*3/mm3      Basophils, Absolute 0.00 10*3/mm3      nRBC 0.0 /100 WBC     POC Glucose Once [[id number]]  (Abnormal) Collected: 06/22/21 0754    Specimen: Blood Updated: 06/22/21 0756     Glucose 134 mg/dL      Comment: Serial Number: [id number]:  [id number]       POC Glucose Once [[id number]]  (Abnormal) Collected: 06/21/21 2025    Specimen: Blood Updated: 06/21/21 2025     Glucose 150 mg/dL      Comment: Serial Number: [id number]:  [id number]       POC Glucose Once [[id number]]  (Abnormal) Collected:  06/21/21 1635    Specimen: Blood Updated: 06/21/21 1639     Glucose 149 mg/dL      Comment: Serial Number: [id number]:  [id number]             Imaging Results (Last 24 Hours)     ** No results found for the last 24 hours. **          EKG      I personally viewed and interpreted the patient's EKG/Telemetry data:    ECHOCARDIOGRAM:    STRESS MYOVIEW:    CARDIAC CATHETERIZATION:    OTHER:         Assessment/Plan     Active Problems:    Dyspnea  Acute coronary syndrome  Congestive heart failure with Takotsubo cardiomyopathy  Hypertension  Hyperlipidemia  Mild renal insufficiency    Patient presented with chest pain or shortness of breath and had ST segment abnormalities in anterolateral leads  Patient underwent cardiac catheterization which showed nonobstructive disease but has significant congestive heart failure consistent with Takotsubo cardiomyopathy  Patient is started on beta-blockers along with her home dose of ACE inhibitor's  Blood pressure and heart rate stable  Patient had an echocardiogram which showed LV dysfunction with a EF of about 30%  Patient has mild renal insufficiency and will be monitored closely because she is using diuretics  Patient's electrolyte levels will be monitored carefully  Patient is also ambulating very well and will be discharged to home and followed in my office in 2 weeks    I discussed the patients findings and my recommendations with patient and nurse    Bennie Pace MD  06/22/21  12:29 EDT

## 2021-06-22 NOTE — DISCHARGE SUMMARY
"  Date of Admission: 6/19/2021    Date of Discharge:  6/22/2021    Length of stay:  LOS: 3 days   Hospital Course  Chief Complaint:       Chief Complaint   Patient presents with   • Shortness of Breath                        Very pleasant 86-year-old female awake alert very talkative and good historian initially admitted to the emergency department for shortness of breath and chest pressure yesterday and diagnosed withr an ST elevated MI with troponin of 0.188 and proBNP of 6849.  She denied any past medical history of coronary artery disease.  She was urgently taken to the Cath Lab yesterday.  Cardiac catheterization showed \"nonobstructive coronary disease and severe left ventricular dysfunction consistent with stress-induced cardiomyopathy\".  EF was estimated at 20 to 25%.  Echo was subsequently done but results have not been released.  He was subsequently and CVCU post cardiac catheterization for ventilatory support.  She did not require intubation.  She is currently stable on 3 L oxygen via nasal cannula.  She has been downgraded from critical care status and we have been consulted for medical management.  She reports she feels fine other than \"my breathing is not what I want to be\".  He is currently being diuresed with Lasix.  Also underwent a DVT Doppler which showed:     \"           Sub-acute right lower extremity superficial thrombophlebitis noted in the greater saphenous (below knee) and small saphenous.  ·           All other veins appeared normal bilaterally.   \"     WBC was initially elevated at 13.1, now down to 10.1 and afebrile.  She is on p.o. doxycycline per pulmonary.  He is being followed by cardiology and placed on ACE and beta-blocker with lipid panel pending.  Creatinine mildly elevated 1.02 likely secondary to Lasix diuresis, calcium 8.5 and on fasting glucose currently 134 with A1c pending.        Pertinent Test Results:     Lab Results (last 48 hours)     Procedure Component Value Units " Date/Time    POC Glucose Once [[id number]]  (Abnormal) Collected: 06/22/21 1157    Specimen: Blood Updated: 06/22/21 1158     Glucose 182 mg/dL      Comment: Serial Number: [id number]:  [id number]       Basic Metabolic Panel [[id number]]  (Abnormal) Collected: 06/22/21 0846    Specimen: Blood Updated: 06/22/21 0917     Glucose 128 mg/dL      BUN 27 mg/dL      Creatinine 1.15 mg/dL      Sodium 139 mmol/L      Potassium 4.0 mmol/L      Chloride 98 mmol/L      CO2 31.0 mmol/L      Calcium 9.3 mg/dL      eGFR Non African Amer 45 mL/min/1.73      BUN/Creatinine Ratio 23.5     Anion Gap 10.0 mmol/L     Narrative:      GFR Normal >60  Chronic Kidney Disease <60  Kidney Failure <15      Magnesium [[id number]]  (Normal) Collected: 06/22/21 0846    Specimen: Blood Updated: 06/22/21 0917     Magnesium 2.0 mg/dL     CBC & Differential [[id number]]  (Abnormal) Collected: 06/22/21 0846    Specimen: Blood Updated: 06/22/21 0857    Narrative:      The following orders were created for panel order CBC & Differential.  Procedure                               Abnormality         Status                     ---------                               -----------         ------                     CBC Auto Differential[[id number]]        Abnormal            Final result                 Please view results for these tests on the individual orders.    CBC Auto Differential [[id number]]  (Abnormal) Collected: 06/22/21 0846    Specimen: Blood Updated: 06/22/21 0857     WBC 7.70 10*3/mm3      RBC 4.39 10*6/mm3      Hemoglobin 13.8 g/dL      Hematocrit 41.1 %      MCV 93.8 fL      MCH 31.4 pg      MCHC 33.5 g/dL      RDW 13.3 %      RDW-SD 42.9 fl      MPV 9.2 fL      Platelets 295 10*3/mm3      Neutrophil % 66.8 %      Lymphocyte % 17.2 %      Monocyte % 11.9 %      Eosinophil % 3.6 %      Basophil % 0.5 %      Neutrophils, Absolute 5.10 10*3/mm3      Lymphocytes, Absolute 1.30 10*3/mm3      Monocytes, Absolute 0.90 10*3/mm3      Eosinophils,  Absolute 0.30 10*3/mm3      Basophils, Absolute 0.00 10*3/mm3      nRBC 0.0 /100 WBC     POC Glucose Once [[id number]]  (Abnormal) Collected: 06/22/21 0754    Specimen: Blood Updated: 06/22/21 0756     Glucose 134 mg/dL      Comment: Serial Number: [id number]:  [id number]       POC Glucose Once [[id number]]  (Abnormal) Collected: 06/21/21 2025    Specimen: Blood Updated: 06/21/21 2025     Glucose 150 mg/dL      Comment: Serial Number: [id number]:  [id number]       POC Glucose Once [[id number]]  (Abnormal) Collected: 06/21/21 1635    Specimen: Blood Updated: 06/21/21 1639     Glucose 149 mg/dL      Comment: Serial Number: [id number]:  [id number]       Hemoglobin A1c [[id number]]  (Abnormal) Collected: 06/21/21 0325    Specimen: Blood Updated: 06/21/21 1006     Hemoglobin A1C 6.5 %     Narrative:      Hemoglobin A1C Reference Range:    <5.7 %        Normal  5.7-6.4 %     Increased risk for diabetes  > 6.4 %        Diabetes       These guidelines have been recommended by the American Diabetic Association for Hgb A1c.      The following 2010 guidelines have been recommended by the American Diabetes Association for Hemoglobin A1c.    HBA1c 5.7-6.4% Increased risk for future diabetes (pre-diabetes)  HBA1c     >6.4% Diabetes      Basic Metabolic Panel [[id number]]  (Abnormal) Collected: 06/21/21 0325    Specimen: Blood Updated: 06/21/21 0403     Glucose 129 mg/dL      BUN 26 mg/dL      Creatinine 1.05 mg/dL      Sodium 139 mmol/L      Potassium 4.2 mmol/L      Chloride 101 mmol/L      CO2 27.0 mmol/L      Calcium 8.5 mg/dL      eGFR Non African Amer 50 mL/min/1.73      BUN/Creatinine Ratio 24.8     Anion Gap 11.0 mmol/L     Narrative:      GFR Normal >60  Chronic Kidney Disease <60  Kidney Failure <15      Lipid Panel [[id number]]  (Abnormal) Collected: 06/21/21 0325    Specimen: Blood Updated: 06/21/21 0359     Total Cholesterol 199 mg/dL      Triglycerides 134 mg/dL      HDL Cholesterol 48 mg/dL      LDL  Cholesterol  127 mg/dL      VLDL Cholesterol 24 mg/dL      LDL/HDL Ratio 2.59    Narrative:      Cholesterol Reference Ranges  (U.S. Department of Health and Human Services ATP III Classifications)    Desirable          <200 mg/dL  Borderline High    200-239 mg/dL  High Risk          >240 mg/dL      Triglyceride Reference Ranges  (U.S. Department of Health and Human Services ATP III Classifications)    Normal           <150 mg/dL  Borderline High  150-199 mg/dL  High             200-499 mg/dL  Very High        >500 mg/dL    HDL Reference Ranges  (U.S. Department of Health and Human Services ATP III Classifcations)    Low     <40 mg/dl (major risk factor for CHD)  High    >60 mg/dl ('negative' risk factor for CHD)        LDL Reference Ranges  (U.S. Department of Health and Human Services ATP III Classifcations)    Optimal          <100 mg/dL  Near Optimal     100-129 mg/dL  Borderline High  130-159 mg/dL  High             160-189 mg/dL  Very High        >189 mg/dL    Calcium, Ionized [[id number]]  (Abnormal) Collected: 06/21/21 0325    Specimen: Blood Updated: 06/21/21 0350     Ionized Calcium 1.18 mmol/L     CBC & Differential [[id number]]  (Abnormal) Collected: 06/21/21 0325    Specimen: Blood Updated: 06/21/21 0335    Narrative:      The following orders were created for panel order CBC & Differential.  Procedure                               Abnormality         Status                     ---------                               -----------         ------                     CBC Auto Differential[[id number]]        Abnormal            Final result                 Please view results for these tests on the individual orders.    CBC Auto Differential [[id number]]  (Abnormal) Collected: 06/21/21 0325    Specimen: Blood Updated: 06/21/21 0335     WBC 10.70 10*3/mm3      RBC 3.97 10*6/mm3      Hemoglobin 12.4 g/dL      Hematocrit 37.1 %      MCV 93.5 fL      MCH 31.3 pg      MCHC 33.5 g/dL      RDW 13.5 %      RDW-SD 44.2  fl      MPV 9.3 fL      Platelets 265 10*3/mm3      Neutrophil % 69.3 %      Lymphocyte % 14.8 %      Monocyte % 13.0 %      Eosinophil % 2.4 %      Basophil % 0.5 %      Neutrophils, Absolute 7.40 10*3/mm3      Lymphocytes, Absolute 1.60 10*3/mm3      Monocytes, Absolute 1.40 10*3/mm3      Eosinophils, Absolute 0.30 10*3/mm3      Basophils, Absolute 0.10 10*3/mm3      nRBC 0.0 /100 WBC             Results for orders placed during the hospital encounter of 06/19/21    Adult Transthoracic Echo Complete W/ Cont if Necessary Per Protocol    Interpretation Summary  · Left ventricular ejection fraction appears to be 31 - 35%.  · The right ventricular cavity is mildly dilated.  · Severe anteroapical and inferoapical wall hypokinesis noted consistent with Takotsubo cardiomyopathy  · No pericardial effusion noted      Imaging Results (All)     Procedure Component Value Units Date/Time    XR Chest 1 View [[id number]] Collected: 06/20/21 0956     Updated: 06/20/21 1003    Narrative:      DATE OF EXAM:  6/20/2021 1:40 AM     PROCEDURE:  XR CHEST 1 VW-     INDICATIONS:  shortness of breath; R06.00-Dyspnea, unspecified; I21.3-ST elevation  (STEMI) myocardial infarction of unspecified site     COMPARISON:  AP chest x-ray 06/19/2021. No additional imaging available for  comparison.     TECHNIQUE:   Single radiographic AP view of the chest was obtained.     FINDINGS:  Cardiac silhouette remains enlarged. There is asymmetric right  interstitial and airspace opacity diffusely. Right perihilar soft  tissues are prominent. Left lung appears grossly clear. No pneumothorax  is seen.        Impression:      Asymmetric interstitial and airspace opacities throughout the right lung  with right perihilar soft tissue opacity. Recommend contrast-enhanced CT  chest to exclude a mass.     Electronically Signed By-Gema Velez MD On:6/20/2021 10:01 AM  This report was finalized on [id number] by  Gema Velez MD.    XR Chest 1 View  "[[id number]] Collected: 06/19/21 1640     Updated: 06/19/21 1644    Narrative:      EXAMINATION: XR CHEST 1 VW-     DATE OF EXAM: 6/19/2021 4:15 PM     INDICATION: dyspnea; R06.00-Dyspnea, unspecified; I21.3-ST elevation  (STEMI) myocardial infarction of unspecified site.     COMPARISON: None available     TECHNIQUE: Portable AP view of the chest was obtained.     FINDINGS:  There is cardiomegaly. There is bilateral perihilar airspace opacities,  right worse than left. There is a background pulmonary interstitial  edema pattern. There is no significant pleural effusion or pneumothorax.  There are multilevel degenerative changes of the thoracic spine.       Impression:      1. Cardiomegaly with bilateral perihilar opacities, right worse than  left.  2. Background pulmonary interstitial edema pattern with Kerley B-lines.     Electronically Signed By-Donaldo Martinez MD On:6/19/2021 4:42 PM  This report was finalized on [id number] by  Donaldo Martinez MD.            Vital Signs  Visit Vitals  BP (!) 115/36   Pulse 50   Temp 97.8 °F (36.6 °C) (Oral)   Resp 20   Ht 162.6 cm (64\")   Wt 101 kg (222 lb 0.1 oz)   SpO2 92%   BMI 38.11 kg/m²       Physical Exam:  Physical Exam  Physical Exam   Constitutional:  oriented to person, place, and time. No distress.   HENT:   Head: Normocephalic and atraumatic.   Eyes: Conjunctivae and EOM are normal. Pupils are equal, round, and reactive to light.   Neck: No JVD present. No thyromegaly present.   Cardiovascular: Normal rate, regular rhythm, normal heart sounds and intact distal pulses. Exam reveals no gallop and no friction rub.   No murmur heard.  Pulmonary/Chest: Effort normal and breath sounds normal. No stridor. No respiratory distress.  has no wheezes.  has no rales.  exhibits no tenderness.   Abdominal: Soft. Bowel sounds are normal.  no distension and no mass. There is no tenderness. There is no rebound and no guarding. No hernia.   Musculoskeletal: Normal range of motion. "   Lymphadenopathy:     no cervical adenopathy.   Neurological:  alert and oriented to person, place, and time. No cranial nerve deficit or sensory deficit. exhibits normal muscle tone.   Skin: No rash noted.  not diaphoretic.   Psychiatric:  normal mood and affect.   Vitals reviewed.      Discharge Medications     Discharge Medications      New Medications      Instructions Start Date   doxycycline 100 MG tablet  Commonly known as: ADOXA   100 mg, Oral, Every 12 Hours Scheduled      furosemide 20 MG tablet  Commonly known as: LASIX   20 mg, Oral, Every 8 Hours      metoprolol tartrate 25 MG tablet  Commonly known as: LOPRESSOR   25 mg, Oral, Every 12 Hours Scheduled      polyethylene glycol 17 g packet  Commonly known as: MIRALAX   17 g, Oral, Daily   Start Date: June 23, 2021        Continue These Medications      Instructions Start Date   aspirin 81 MG chewable tablet   81 mg, Oral, Daily      Centrum Adults tablet tablet   1 tablet, Oral, Daily      lisinopril 40 MG tablet  Commonly known as: PRINIVIL,ZESTRIL   40 mg, Oral, Daily      Osteo Bi-Flex Joint Shield tablet   1 tablet, Oral, 2 times daily, Osteo Bi-Flex with Turmeric       oxybutynin 5 MG tablet  Commonly known as: DITROPAN   5 mg, Oral, 2 Times Daily             Discharge Diet:   Diet Instructions     Diet: Cardiac      Discharge Diet: Cardiac          Activity at Discharge:   Activity Instructions     Activity as Tolerated            Follow-up Appointments  Future Appointments   Date Time Provider Department Center   7/7/2021 12:30 PM Bennie Pace MD [facility]     Additional Instructions for the Follow-ups that You Need to Schedule     Discharge Follow-up with PCP   As directed       Currently Documented PCP:    Bartolo Meng    PCP Phone Number:    [phone]     Follow Up Details: one week         Discharge Follow-up with Specified Provider: cardiology and pulmonology one week   As directed      To: cardiology and pulmonology one  week             Hospital course and problem list    Shortness of breath  Acute  Due to Takotsubo cardiomyopathy  Status post cardiac catheterization showed nonobstructive coronary artery disease severe LV dysfunction  Status post 2D echocardiogram EF 30% with severe anterior apical and inferior apical wall hypokinesis  Continue beta-blocker and ACE inhibitor  Patient was also diuresed with p.o. Lasix and will be continued on 20 mg of Lasix daily as an outpatient  Cardiology on board  Chest x-ray consistent with pulmonary edema  Patient is also on antibiotic per pulmonary recommendations and antibiotic course will be finished  Pulmonary on board     Hypertension  Continue lisinopril and metoprolol     Kidney injury  Creatinine 1.15, stable with good urine output.  She will follow up with PCP for repeat BMP to ensure renal function is stable.     GERD  Continue PPI     Overactive bladder  Patient is on oxybutynin     Prediabetes  Blood sugar stable  Insulin sliding scale Accu-Cheks provided while here  Follow-up with PCP for further care     DVT PUD prophylaxis     Plan discharge home stable condition follow-up with PCP cardiology as an outpatient.      Vale Miller MD  06/22/21  12:00 EDT    Time: Discharge 38 min

## 2021-06-22 NOTE — PROGRESS NOTES
Exercise Oximetry    Patient Name:Suzette Blandon   MRN: [id number]   Date: 06/22/21             ROOM AIR BASELINE   SpO2% 95%   Heart Rate 53   Blood Pressure      EXERCISE ON ROOM AIR SpO2% EXERCISE ON O2 @  LPM SpO2%   1 MINUTE 95% 1 MINUTE    2 MINUTES 94% 2 MINUTES    3 MINUTES 96% 3 MINUTES    4 MINUTES 98% 4 MINUTES    5 MINUTES 97% 5 MINUTES    6 MINUTES 96% 6 MINUTES               Distance Walked  Paced in room 6 Minutes Distance Walked   Dyspnea (Saurabh Scale)   Dyspnea (Saurabh Scale)   Fatigue (Saurabh Scale)   Fatigue (Saurabh Scale)   SpO2% Post Exercise  97% SpO2% Post Exercise   HR Post Exercise  63 HR Post Exercise   Time to Recovery  <1 Minute Time to Recovery     Comments: Patient does not require Home Oxygen. Mario Olsen, CRT

## 2021-06-22 NOTE — CASE MANAGEMENT/SOCIAL WORK
Continued Stay Note  [facility]     Patient Name: Suzette Blandon  MRN: [id number]  Today's Date: 6/22/2021    Admit Date: 6/19/2021    Discharge Plan     Row Name 06/22/21 1315       Plan    Plan Comments  patient requesting RW. order sent in [facility] notified. patient completed and passed walking oximetry, patient to DC home to her camper.    Final Discharge Disposition Code  01 - home or self-care    Final Note  home        Expected Discharge Date and Time     Expected Discharge Date Expected Discharge Time    Jun 22, 2021         Phone communication or documentation only - no physical contact with patient or family.    Shantelle Bucio, RN

## 2021-06-22 NOTE — CASE MANAGEMENT/SOCIAL WORK
Social Work Assessment  [facility]     Patient Name: Suzette Blandon  MRN: [id number]  Today's Date: 6/22/2021    Admit Date: 6/19/2021     Discharge Plan     Row Name 06/22/21 1603       Plan    Plan  Anticipate routine home with family, with family transport at discharge.    Plan Comments  Consulted due to patient living in a camper. Spoke with patient at bedside, who informed SW that her/spouse have been living in [facility] since 2005-06. 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 Patient states that they do not have water in the winter, so to preapre they fill multiple gallons of [employer] (they drink a lot of this per patient) with water, which gets them through the winter. Patient reports they have no needs at this time and lots of family support. Anticipate return at d/c.     Met with patient in room wearing PPE: mask, face shield/goggles, gloves, gown.      Maintained distance greater than six feet and spent less than 15 minutes in the room.      LOREN Walters    Phone: [phone]  Cell: [phone]  Fax: [phone]  [email or web address]

## 2021-06-22 NOTE — PROGRESS NOTES
"PULMONARY CRITICAL CARE Progress  NOTE      PATIENT IDENTIFICATION:  Name: Suzette Blandon  MRN: [id number]  :  1934     Age: 86 y.o.  Sex: female    DATE OF Note:  2021   Referring Physician: Liz Deleon MD                  Subjective:   Feeling better, less SOB, sitting in chair at bedside  No chest or abd pain, no B & B issues   No new complaints voiced       Objective:  tMax 24 hrs: Temp (24hrs), Av.8 °F (36.6 °C), Min:97.6 °F (36.4 °C), Max:97.9 °F (36.6 °C)      Vitals Ranges:   Temp:  [97.6 °F (36.4 °C)-97.9 °F (36.6 °C)] 97.8 °F (36.6 °C)  Heart Rate:  [50-59] 50  Resp:  [20-22] 20  BP: (107-140)/(36-80) 115/36    Intake and Output Last 3 Shifts:   I/O last 3 completed shifts:  In: 720 [P.O.:720]  Out: 4350 [Urine:4350]    Exam:  BP (!) 115/36   Pulse 50   Temp 97.8 °F (36.6 °C) (Oral)   Resp 20   Ht 162.6 cm (64\")   Wt 101 kg (222 lb 0.1 oz)   SpO2 92%   BMI 38.11 kg/m²     General Appearance: Alert    HEENT:  Normocephalic, without obvious abnormality, Conjunctiva/corneas clear,.  Normal external ear canals, Nares normal, no drainage     Neck:  Supple, symmetrical, trachea midline. No JVD.  Lungs /Chest wall:   Bilateral basal rhonchi improved , respirations unlabored symmetrical wall movement.     Heart:  Regular rate and rhythm, systolic murmur PMI left sternal border  Abdomen: Soft, non-tender, no masses, no organomegaly.    Extremities: Trace edema no clubbing or Cyanosis        Medications:    Current Facility-Administered Medications:     acetaminophen (TYLENOL) tablet 650 mg, 650 mg, Oral, Q4H PRN, Bennie Pace MD    aspirin chewable tablet 81 mg, 81 mg, Oral, Daily, Bennie Pace MD, 81 mg at 21 0826    atropine sulfate injection 0.5 mg, 0.5 mg, Intravenous, Q5 Min PRN, Bennie Pace MD    bisacodyl (DULCOLAX) EC tablet 10 mg, 10 mg, Oral, Daily PRN, Vale Miller MD, 10 mg at 21 0941    dextrose (D50W) 25 g/ 50mL Intravenous Solution 25 g, 25 g, Intravenous, " Q15 Min PRN, Vale Miller MD    dextrose (GLUTOSE) oral gel 15 g, 15 g, Oral, Q15 Min PRN, Vale Miller MD    doxycycline (ADOXA) tablet 100 mg, 100 mg, Oral, Q12H, Draw, MD Liz, 100 mg at 06/22/21 0827    furosemide (LASIX) tablet 20 mg, 20 mg, Oral, Q8H, Day, SANDRA Kearney, 20 mg at 06/22/21 0610    glucagon (human recombinant) (GLUCAGEN DIAGNOSTIC) injection 1 mg, 1 mg, Subcutaneous, Q15 Min PRN, Vale Miller MD    heparin (porcine) 5000 UNIT/ML injection 5,000 Units, 5,000 Units, Subcutaneous, Q8H, Vale Miller MD, 5,000 Units at 06/22/21 0610    insulin lispro (ADMELOG) injection 0-7 Units, 0-7 Units, Subcutaneous, TID AC **AND** insulin lispro (ADMELOG) injection 0-7 Units, 0-7 Units, Subcutaneous, PRN, Vale Miller MD    lisinopril (PRINIVIL,ZESTRIL) tablet 40 mg, 40 mg, Oral, Daily, Bennie Pace MD, 40 mg at 06/22/21 0826    metoprolol tartrate (LOPRESSOR) tablet 25 mg, 25 mg, Oral, Q12H, Bennie Pace MD, 25 mg at 06/22/21 0826    oxybutynin (DITROPAN) tablet 5 mg, 5 mg, Oral, BID, Bennie Pace MD, 5 mg at 06/22/21 0826    pantoprazole (PROTONIX) EC tablet 40 mg, 40 mg, Oral, Q AM, Gunjan Recio APRN, 40 mg at 06/22/21 0610    polyethylene glycol (MIRALAX) packet 17 g, 17 g, Oral, Daily, DrawLiz MD, 17 g at 06/22/21 0826    [COMPLETED] Insert peripheral IV, , , Once **AND** sodium chloride 0.9 % flush 10 mL, 10 mL, Intravenous, PRN, Bennie Pace MD, 10 mL at 06/22/21 0827    sodium chloride 0.9 % infusion 250 mL, 250 mL, Intravenous, Once PRN, Bennie Pace MD    Current Outpatient Medications:     aspirin 81 MG chewable tablet, Chew 81 mg Daily., Disp: , Rfl:     lisinopril (PRINIVIL,ZESTRIL) 40 MG tablet, Take 40 mg by mouth Daily., Disp: , Rfl:     Misc Natural Products (Osteo Bi-Flex Joint Shield) tablet, Take 1 tablet by mouth 2 (two) times a day. Osteo Bi-Flex with Turmeric, Disp: , Rfl:     multivitamin with minerals (Centrum Adults) tablet tablet, Take 1 tablet by  mouth Daily., Disp: , Rfl:     oxybutynin (DITROPAN) 5 MG tablet, Take 5 mg by mouth 2 (Two) Times a Day., Disp: , Rfl:     doxycycline (ADOXA) 100 MG tablet, Take 1 tablet by mouth Every 12 (Twelve) Hours for 10 doses. Indications: Upper Respiratory Tract Infection, Disp: 10 tablet, Rfl: 0    furosemide (LASIX) 20 MG tablet, Take 1 tablet by mouth Daily., Disp: 20 tablet, Rfl: 0    metoprolol tartrate (LOPRESSOR) 25 MG tablet, Take 1 tablet by mouth Every 12 (Twelve) Hours., Disp: 30 tablet, Rfl: 0    [START ON 6/23/2021] polyethylene glycol (MIRALAX) 17 GM/SCOOP powder, Take 17 g by mouth Daily., Disp: 510 g, Rfl: 0    Data Review:  All labs (24hrs):   Recent Results (from the past 24 hour(s))   POC Glucose Once    Collection Time: 06/21/21  4:35 PM    Specimen: Blood   Result Value Ref Range    Glucose 149 (H) 70 - 105 mg/dL   POC Glucose Once    Collection Time: 06/21/21  8:25 PM    Specimen: Blood   Result Value Ref Range    Glucose 150 (H) 70 - 105 mg/dL   POC Glucose Once    Collection Time: 06/22/21  7:54 AM    Specimen: Blood   Result Value Ref Range    Glucose 134 (H) 70 - 105 mg/dL   Basic Metabolic Panel    Collection Time: 06/22/21  8:46 AM    Specimen: Blood   Result Value Ref Range    Glucose 128 (H) 65 - 99 mg/dL    BUN 27 (H) 8 - 23 mg/dL    Creatinine 1.15 (H) 0.57 - 1.00 mg/dL    Sodium 139 136 - 145 mmol/L    Potassium 4.0 3.5 - 5.2 mmol/L    Chloride 98 98 - 107 mmol/L    CO2 31.0 (H) 22.0 - 29.0 mmol/L    Calcium 9.3 8.6 - 10.5 mg/dL    eGFR Non African Amer 45 (L) >60 mL/min/1.73    BUN/Creatinine Ratio 23.5 7.0 - 25.0    Anion Gap 10.0 5.0 - 15.0 mmol/L   Magnesium    Collection Time: 06/22/21  8:46 AM    Specimen: Blood   Result Value Ref Range    Magnesium 2.0 1.6 - 2.4 mg/dL   CBC Auto Differential    Collection Time: 06/22/21  8:46 AM    Specimen: Blood   Result Value Ref Range    WBC 7.70 3.40 - 10.80 10*3/mm3    RBC 4.39 3.77 - 5.28 10*6/mm3    Hemoglobin 13.8 12.0 - 15.9 g/dL     Hematocrit 41.1 34.0 - 46.6 %    MCV 93.8 79.0 - 97.0 fL    MCH 31.4 26.6 - 33.0 pg    MCHC 33.5 31.5 - 35.7 g/dL    RDW 13.3 12.3 - 15.4 %    RDW-SD 42.9 37.0 - 54.0 fl    MPV 9.2 6.0 - 12.0 fL    Platelets 295 140 - 450 10*3/mm3    Neutrophil % 66.8 42.7 - 76.0 %    Lymphocyte % 17.2 (L) 19.6 - 45.3 %    Monocyte % 11.9 5.0 - 12.0 %    Eosinophil % 3.6 0.3 - 6.2 %    Basophil % 0.5 0.0 - 1.5 %    Neutrophils, Absolute 5.10 1.70 - 7.00 10*3/mm3    Lymphocytes, Absolute 1.30 0.70 - 3.10 10*3/mm3    Monocytes, Absolute 0.90 0.10 - 0.90 10*3/mm3    Eosinophils, Absolute 0.30 0.00 - 0.40 10*3/mm3    Basophils, Absolute 0.00 0.00 - 0.20 10*3/mm3    nRBC 0.0 0.0 - 0.2 /100 WBC   POC Glucose Once    Collection Time: 21 11:57 AM    Specimen: Blood   Result Value Ref Range    Glucose 182 (H) 70 - 105 mg/dL        Imaging:  Adult Transthoracic Echo Complete W/ Cont if Necessary Per Protocol  · Left ventricular ejection fraction appears to be 31 - 35%.  · The right ventricular cavity is mildly dilated.  · Severe anteroapical and inferoapical wall hypokinesis noted consistent   with Takotsubo cardiomyopathy  · No pericardial effusion noted     Cardiac Catheterization/Vascular Study  Heart Cath Report    NAME:              Suzette Blandon  :                1934  AGE/SEX:        86 y.o. female  MRN:                [id number]  ADM DATE:      [unfilled]  DOS:                     Pre-Procedure Notes  H&P Performed  [x]  Yes []  No       []  N/A    Indications:   [x]  ACS <= 24 HRS   []  ACS >24 HRS   [x]  New Onset Angina <= 2 mos   []  Worsening Angina   []  Resuscitated Cardiac Arrest   []  Angina on Exertion:   []  Suspected CAD   []  Valvular Disease   []  Pericardial Disease   []  Cardiac Arrythmia   []  Cardiomyopathy   [x]  LV Dysfunction   []  Syncope   []  Post Cardiac Transplant   []  Eval. For Exercise Clearance   []  Abnormal Stress Test    []  Other   []  Pre-Operative Evaluation        If Pre-Op Eval:    Evaluation for Surgery Type:  []  Cardiac Surgery   []  Non-Cardiac   Surgery   Functional Capacity:  []  <4 METS  []  >=4 METS w/o symptoms          []  >= 4 METS with symptoms  []  Unknown   Surgical Risk:  []  Low  []  Intermediate         []  High Risk: Vascular  []  High Risk Non-Vascular    Risks, Benefits, & Complications Discussed:  [x]  Yes  []  No  []  N/A    Questions Answered:  [x]  Yes  []  No  []  N/A    Consent Obtained:  [x]  Yes  []  No  []  N/A    CHF: [x]  Yes  []  No     If Yes:  Newly Diagnosed?  [x]  Yes  []  No   If Yes:  HF Type:  []  Diastolic  [x]  Systolic  []  Unknown     Procedure Description  The patient underwent successful [x]  Left  []  Right  []  Left & Right    Heart catheterization and coronary angiography via the   [x]  Femoral approach  []  Radial approach  []  Brachial approach    Procedure Narrative:   Informed consent was obtained from the patient after explaining risks and   benefits.  Patient was brought to the cardiac catheterization laboratory   and placed on the table in the usual fashion.  Right groin was shaved and   prepped in sterile fashion. Moderate conscious sedation was given   utilizing IV Versed and fentanyl administered by RN with continuous EKG   oximetry and hemodynamic monitoring supervised by me throughout the entire   case, conscious sedation time was 30 minutes.  I was present with the   patient for the duration of moderate sedation and supervised staff who had   no other duties and monitored the patient for the entire procedure patient   had Hunt 2-3 sedation scale. 2% lidocaine was used for local anesthesia   to the right groin.  A total of 20 cc was used.  A 6 Qatari sheath was   placed in the right femoral artery.  A 6 Qatari pigtail catheter, 6 Qatari   JR4 catheter and a 6 Qatari JL4 catheter was used for the procedure.    After the cardiac catheterization is complete, all the catheters and   sheath were removed.  Patient tolerated the procedure  very well.  No   complications noted.    Hemodynamic    LVEDP:12-16   Estimated EF %: 20 to 25%      Initial Aortic Pressure: 130/80    AV Gradient: No gradient    Rt. Heart Pressure:    Wall Motion:      Dominance:  []  Left  []  Right  [x]  Co-Dominant      Coronary Arteriography: (Please Code highest degree of stenosis)    Left Main %:   0  Proximal LAD %:  0  Mid/Distal LAD %:  0.  50 to 60% diagonal disease  LCX %: 30%  Ramus:    RCA %: 0   Lima %:    SVG(s) %:       Complications: No complications    Estimated Blood Loss:  None    Impression and Recommendation: Nonobstructive coronary disease  Severe LV dysfunction consistent with stress-induced cardiomyopathy    Electronically signed by Bennie Pace MD, 06/19/21, 3:36 PM EDT       ASSESSMENT:  ACS  Cardiomyopathy  HTN  Hyperlipidemia  GERD         PLAN:  May discharge and follow up with our service in 2-3 weeks   Antibiotics-Doxycycline  Diuretics   O2 support to main sats 86% and above  Electrolytes/ glycemic control  DVT and GI prophylaxis    Discussed with Dr Talib Browne, APRN   6/22/2021  15:54 EDT     I personally have examined  and interviewed the patient. I have reviewed the history, data, problems, assessment and plan with our NP.  Critical care time in direct medical management (   ) minutes  Electronically signed by Saniya Mayers MD, D,ABS, 06/22/21, 10:13 PM EDT.

## 2021-06-22 NOTE — DISCHARGE PLACEMENT REQUEST
"Suzette Dykes (86 y.o. Female)     Date of Birth Social Security Number Address Home Phone MRN    1934  [address] [address] [phone] [id number]    Mu-ism Marital Status          Baptism        Admission Date Admission Type Admitting Provider Attending Provider Department, Room/Bed    6/19/21 Emergency Draw, MD Paul Hill Radomir, MD [facility], 2125/1    Discharge Date Discharge Disposition Discharge Destination                       Attending Provider: Vale Miller MD    Allergies: Oxycodone, Penicillins, Polyoxyethylene Lauryl Ether [Sorbitan]    Isolation: None   Infection: None   Code Status: CPR    Ht: 162.6 cm (64\")   Wt: 101 kg (222 lb 0.1 oz)    Ad[facility] Cmt[facility] [place] [place]   Principal Problem: None                Active Insurance as of 6/19/2021     Primary Coverage     Payor Plan Insurance Group Employer/Plan Group    ANTHEM MEDICARE REPLACEMENT ANTHEM MEDICARE ADVANTAGE [id number]     Payor Plan Address Payor Plan Phone Number Payor Plan Fax Number Effective Dates    [address] [phone]  8/1/2020 - None Entered    [facility] [id number]       Subscriber Name Subscriber Birth Date Member ID       SUZETTE DYKES 1934 [id number]                 Emergency Contacts      (Rel.) Home Phone Work Phone Mobile Phone    KAYLIN MEEK (Son) [phone] -- [phone]              "

## 2021-06-22 NOTE — PLAN OF CARE
Goal Outcome Evaluation:  Plan of Care Reviewed With: patient        Progress: no change  Outcome Summary: Patient has rested throughout the shift. Patient has voiced no complaints. Patient has experienced no acute events. Will continue to monitor.

## 2021-06-23 ENCOUNTER — READMISSION MANAGEMENT (OUTPATIENT)
Dept: CALL CENTER | Facility: HOSPITAL | Age: 86
End: 2021-06-23

## 2021-06-23 NOTE — OUTREACH NOTE
Prep Survey      Responses   [facility] facility patient discharged from?  [facility]   Is LACE score < 7 ?  Yes   Emergency Room discharge w/ pulse ox?  No   Eligibility  Readm Mgmt   Discharge diagnosis  SOA    Does the patient have one of the following disease processes/diagnoses(primary or secondary)?  Other   Does the patient have Home health ordered?  No   Is there a DME ordered?  No   Comments regarding appointments  see avs   Medication alerts for this patient  see avs   Prep survey completed?  Yes          Paola Shane RN

## 2021-06-26 ENCOUNTER — APPOINTMENT (OUTPATIENT)
Dept: GENERAL RADIOLOGY | Facility: HOSPITAL | Age: 86
End: 2021-06-26

## 2021-06-26 ENCOUNTER — HOSPITAL ENCOUNTER (OUTPATIENT)
Facility: HOSPITAL | Age: 86
Setting detail: OBSERVATION
Discharge: HOME | End: 2021-06-28
Attending: INTERNAL MEDICINE | Admitting: INTERNAL MEDICINE

## 2021-06-26 DIAGNOSIS — R06.00 DYSPNEA, UNSPECIFIED TYPE: Primary | ICD-10-CM

## 2021-06-26 DIAGNOSIS — R07.89 CHEST TIGHTNESS: ICD-10-CM

## 2021-06-26 LAB
ALBUMIN SERPL-MCNC: 4.1 G/DL (ref 3.5–5.2)
ALBUMIN/GLOB SERPL: 1.5 G/DL
ALP SERPL-CCNC: 66 U/L (ref 39–117)
ALT SERPL W P-5'-P-CCNC: 61 U/L (ref 1–33)
ANION GAP SERPL CALCULATED.3IONS-SCNC: 11 MMOL/L (ref 5–15)
ANION GAP SERPL CALCULATED.3IONS-SCNC: 12 MMOL/L (ref 5–15)
AST SERPL-CCNC: 47 U/L (ref 1–32)
B PARAPERT DNA SPEC QL NAA+PROBE: NOT DETECTED
B PERT DNA SPEC QL NAA+PROBE: NOT DETECTED
BASOPHILS # BLD AUTO: 0 10*3/MM3 (ref 0–0.2)
BASOPHILS # BLD AUTO: 0.1 10*3/MM3 (ref 0–0.2)
BASOPHILS NFR BLD AUTO: 0.6 % (ref 0–1.5)
BASOPHILS NFR BLD AUTO: 0.6 % (ref 0–1.5)
BILIRUB SERPL-MCNC: 0.4 MG/DL (ref 0–1.2)
BUN SERPL-MCNC: 22 MG/DL (ref 8–23)
BUN SERPL-MCNC: 22 MG/DL (ref 8–23)
BUN/CREAT SERPL: 19.1 (ref 7–25)
BUN/CREAT SERPL: 19.3 (ref 7–25)
C PNEUM DNA NPH QL NAA+NON-PROBE: NOT DETECTED
CALCIUM SPEC-SCNC: 9.3 MG/DL (ref 8.6–10.5)
CALCIUM SPEC-SCNC: 9.3 MG/DL (ref 8.6–10.5)
CHLORIDE SERPL-SCNC: 100 MMOL/L (ref 98–107)
CHLORIDE SERPL-SCNC: 103 MMOL/L (ref 98–107)
CO2 SERPL-SCNC: 26 MMOL/L (ref 22–29)
CO2 SERPL-SCNC: 28 MMOL/L (ref 22–29)
CREAT SERPL-MCNC: 1.14 MG/DL (ref 0.57–1)
CREAT SERPL-MCNC: 1.15 MG/DL (ref 0.57–1)
DEPRECATED RDW RBC AUTO: 43.8 FL (ref 37–54)
DEPRECATED RDW RBC AUTO: 45.9 FL (ref 37–54)
EOSINOPHIL # BLD AUTO: 0.1 10*3/MM3 (ref 0–0.4)
EOSINOPHIL # BLD AUTO: 0.2 10*3/MM3 (ref 0–0.4)
EOSINOPHIL NFR BLD AUTO: 1.8 % (ref 0.3–6.2)
EOSINOPHIL NFR BLD AUTO: 2.3 % (ref 0.3–6.2)
ERYTHROCYTE [DISTWIDTH] IN BLOOD BY AUTOMATED COUNT: 13.4 % (ref 12.3–15.4)
ERYTHROCYTE [DISTWIDTH] IN BLOOD BY AUTOMATED COUNT: 13.8 % (ref 12.3–15.4)
FLUAV SUBTYP SPEC NAA+PROBE: NOT DETECTED
FLUBV RNA ISLT QL NAA+PROBE: NOT DETECTED
GFR SERPL CREATININE-BSD FRML MDRD: 45 ML/MIN/1.73
GFR SERPL CREATININE-BSD FRML MDRD: 45 ML/MIN/1.73
GLOBULIN UR ELPH-MCNC: 2.7 GM/DL
GLUCOSE SERPL-MCNC: 145 MG/DL (ref 65–99)
GLUCOSE SERPL-MCNC: 99 MG/DL (ref 65–99)
HADV DNA SPEC NAA+PROBE: NOT DETECTED
HCOV 229E RNA SPEC QL NAA+PROBE: NOT DETECTED
HCOV HKU1 RNA SPEC QL NAA+PROBE: NOT DETECTED
HCOV NL63 RNA SPEC QL NAA+PROBE: NOT DETECTED
HCOV OC43 RNA SPEC QL NAA+PROBE: NOT DETECTED
HCT VFR BLD AUTO: 39.4 % (ref 34–46.6)
HCT VFR BLD AUTO: 40.1 % (ref 34–46.6)
HGB BLD-MCNC: 13.2 G/DL (ref 12–15.9)
HGB BLD-MCNC: 13.5 G/DL (ref 12–15.9)
HMPV RNA NPH QL NAA+NON-PROBE: NOT DETECTED
HOLD SPECIMEN: NORMAL
HPIV1 RNA SPEC QL NAA+PROBE: NOT DETECTED
HPIV2 RNA SPEC QL NAA+PROBE: NOT DETECTED
HPIV3 RNA NPH QL NAA+PROBE: NOT DETECTED
HPIV4 P GENE NPH QL NAA+PROBE: NOT DETECTED
LYMPHOCYTES # BLD AUTO: 1.4 10*3/MM3 (ref 0.7–3.1)
LYMPHOCYTES # BLD AUTO: 1.9 10*3/MM3 (ref 0.7–3.1)
LYMPHOCYTES NFR BLD AUTO: 17.4 % (ref 19.6–45.3)
LYMPHOCYTES NFR BLD AUTO: 23 % (ref 19.6–45.3)
M PNEUMO IGG SER IA-ACNC: NOT DETECTED
MAGNESIUM SERPL-MCNC: 2.2 MG/DL (ref 1.6–2.4)
MCH RBC QN AUTO: 31.2 PG (ref 26.6–33)
MCH RBC QN AUTO: 31.5 PG (ref 26.6–33)
MCHC RBC AUTO-ENTMCNC: 33.5 G/DL (ref 31.5–35.7)
MCHC RBC AUTO-ENTMCNC: 33.7 G/DL (ref 31.5–35.7)
MCV RBC AUTO: 93 FL (ref 79–97)
MCV RBC AUTO: 93.4 FL (ref 79–97)
MONOCYTES # BLD AUTO: 1.1 10*3/MM3 (ref 0.1–0.9)
MONOCYTES # BLD AUTO: 1.1 10*3/MM3 (ref 0.1–0.9)
MONOCYTES NFR BLD AUTO: 12.9 % (ref 5–12)
MONOCYTES NFR BLD AUTO: 13.3 % (ref 5–12)
NEUTROPHILS NFR BLD AUTO: 5.1 10*3/MM3 (ref 1.7–7)
NEUTROPHILS NFR BLD AUTO: 5.6 10*3/MM3 (ref 1.7–7)
NEUTROPHILS NFR BLD AUTO: 61.2 % (ref 42.7–76)
NEUTROPHILS NFR BLD AUTO: 66.9 % (ref 42.7–76)
NRBC BLD AUTO-RTO: 0 /100 WBC (ref 0–0.2)
NRBC BLD AUTO-RTO: 0.1 /100 WBC (ref 0–0.2)
NT-PROBNP SERPL-MCNC: ABNORMAL PG/ML (ref 0–1800)
PHOSPHATE SERPL-MCNC: 3.6 MG/DL (ref 2.5–4.5)
PLATELET # BLD AUTO: 321 10*3/MM3 (ref 140–450)
PLATELET # BLD AUTO: 338 10*3/MM3 (ref 140–450)
PMV BLD AUTO: 8.7 FL (ref 6–12)
PMV BLD AUTO: 8.9 FL (ref 6–12)
POTASSIUM SERPL-SCNC: 3.6 MMOL/L (ref 3.5–5.2)
POTASSIUM SERPL-SCNC: 4.1 MMOL/L (ref 3.5–5.2)
PROCALCITONIN SERPL-MCNC: 0.07 NG/ML (ref 0–0.25)
PROT SERPL-MCNC: 6.8 G/DL (ref 6–8.5)
RBC # BLD AUTO: 4.23 10*6/MM3 (ref 3.77–5.28)
RBC # BLD AUTO: 4.29 10*6/MM3 (ref 3.77–5.28)
RHINOVIRUS RNA SPEC NAA+PROBE: NOT DETECTED
RSV RNA NPH QL NAA+NON-PROBE: NOT DETECTED
SARS-COV-2 RNA NPH QL NAA+NON-PROBE: NOT DETECTED
SODIUM SERPL-SCNC: 140 MMOL/L (ref 136–145)
SODIUM SERPL-SCNC: 140 MMOL/L (ref 136–145)
TROPONIN T SERPL-MCNC: 0.04 NG/ML (ref 0–0.03)
WBC # BLD AUTO: 8.3 10*3/MM3 (ref 3.4–10.8)
WBC # BLD AUTO: 8.4 10*3/MM3 (ref 3.4–10.8)

## 2021-06-26 PROCEDURE — 93010 ELECTROCARDIOGRAM REPORT: CPT | Performed by: INTERNAL MEDICINE

## 2021-06-26 PROCEDURE — 84484 ASSAY OF TROPONIN QUANT: CPT | Performed by: PHYSICIAN ASSISTANT

## 2021-06-26 PROCEDURE — 99285 EMERGENCY DEPT VISIT HI MDM: CPT

## 2021-06-26 PROCEDURE — G0378 HOSPITAL OBSERVATION PER HR: HCPCS

## 2021-06-26 PROCEDURE — 93005 ELECTROCARDIOGRAM TRACING: CPT | Performed by: INTERNAL MEDICINE

## 2021-06-26 PROCEDURE — 84145 PROCALCITONIN (PCT): CPT | Performed by: PHYSICIAN ASSISTANT

## 2021-06-26 PROCEDURE — 0202U NFCT DS 22 TRGT SARS-COV-2: CPT | Performed by: PHYSICIAN ASSISTANT

## 2021-06-26 PROCEDURE — 84100 ASSAY OF PHOSPHORUS: CPT | Performed by: INTERNAL MEDICINE

## 2021-06-26 PROCEDURE — 71046 X-RAY EXAM CHEST 2 VIEWS: CPT

## 2021-06-26 PROCEDURE — 85025 COMPLETE CBC W/AUTO DIFF WBC: CPT | Performed by: INTERNAL MEDICINE

## 2021-06-26 PROCEDURE — 96374 THER/PROPH/DIAG INJ IV PUSH: CPT

## 2021-06-26 PROCEDURE — 80053 COMPREHEN METABOLIC PANEL: CPT | Performed by: PHYSICIAN ASSISTANT

## 2021-06-26 PROCEDURE — 93005 ELECTROCARDIOGRAM TRACING: CPT

## 2021-06-26 PROCEDURE — 25010000002 FUROSEMIDE PER 20 MG: Performed by: PHYSICIAN ASSISTANT

## 2021-06-26 PROCEDURE — 83880 ASSAY OF NATRIURETIC PEPTIDE: CPT | Performed by: PHYSICIAN ASSISTANT

## 2021-06-26 PROCEDURE — 99220 PR INITIAL OBSERVATION CARE/DAY 70 MINUTES: CPT | Performed by: INTERNAL MEDICINE

## 2021-06-26 PROCEDURE — 85025 COMPLETE CBC W/AUTO DIFF WBC: CPT | Performed by: PHYSICIAN ASSISTANT

## 2021-06-26 PROCEDURE — 83735 ASSAY OF MAGNESIUM: CPT | Performed by: INTERNAL MEDICINE

## 2021-06-26 RX ORDER — SODIUM CHLORIDE 0.9 % (FLUSH) 0.9 %
10 SYRINGE (ML) INJECTION AS NEEDED
Status: DISCONTINUED | OUTPATIENT
Start: 2021-06-26 | End: 2021-06-28 | Stop reason: HOSPADM

## 2021-06-26 RX ORDER — FUROSEMIDE 10 MG/ML
40 INJECTION INTRAMUSCULAR; INTRAVENOUS
Status: DISCONTINUED | OUTPATIENT
Start: 2021-06-26 | End: 2021-06-26

## 2021-06-26 RX ORDER — ASPIRIN 81 MG/1
324 TABLET, CHEWABLE ORAL ONCE
Status: COMPLETED | OUTPATIENT
Start: 2021-06-26 | End: 2021-06-26

## 2021-06-26 RX ORDER — FUROSEMIDE 10 MG/ML
40 INJECTION INTRAMUSCULAR; INTRAVENOUS
Status: DISCONTINUED | OUTPATIENT
Start: 2021-06-27 | End: 2021-06-28 | Stop reason: HOSPADM

## 2021-06-26 RX ORDER — ASPIRIN 81 MG/1
81 TABLET, CHEWABLE ORAL DAILY
Status: DISCONTINUED | OUTPATIENT
Start: 2021-06-27 | End: 2021-06-28 | Stop reason: HOSPADM

## 2021-06-26 RX ORDER — POLYETHYLENE GLYCOL 3350 17 G/17G
17 POWDER, FOR SOLUTION ORAL DAILY
Status: DISCONTINUED | OUTPATIENT
Start: 2021-06-27 | End: 2021-06-28 | Stop reason: HOSPADM

## 2021-06-26 RX ORDER — NITROGLYCERIN 0.4 MG/1
0.4 TABLET SUBLINGUAL
Status: DISCONTINUED | OUTPATIENT
Start: 2021-06-26 | End: 2021-06-28 | Stop reason: HOSPADM

## 2021-06-26 RX ORDER — SODIUM CHLORIDE 0.9 % (FLUSH) 0.9 %
10 SYRINGE (ML) INJECTION EVERY 12 HOURS SCHEDULED
Status: DISCONTINUED | OUTPATIENT
Start: 2021-06-26 | End: 2021-06-28 | Stop reason: HOSPADM

## 2021-06-26 RX ORDER — FUROSEMIDE 20 MG/1
20 TABLET ORAL DAILY
Status: DISCONTINUED | OUTPATIENT
Start: 2021-06-27 | End: 2021-06-26

## 2021-06-26 RX ORDER — DOXYCYCLINE 100 MG/1
100 TABLET ORAL EVERY 12 HOURS SCHEDULED
Status: COMPLETED | OUTPATIENT
Start: 2021-06-26 | End: 2021-06-27

## 2021-06-26 RX ORDER — OXYBUTYNIN CHLORIDE 5 MG/1
5 TABLET ORAL 2 TIMES DAILY
Status: DISCONTINUED | OUTPATIENT
Start: 2021-06-26 | End: 2021-06-28 | Stop reason: HOSPADM

## 2021-06-26 RX ORDER — LISINOPRIL 20 MG/1
40 TABLET ORAL DAILY
Status: DISCONTINUED | OUTPATIENT
Start: 2021-06-27 | End: 2021-06-28 | Stop reason: HOSPADM

## 2021-06-26 RX ORDER — FUROSEMIDE 10 MG/ML
40 INJECTION INTRAMUSCULAR; INTRAVENOUS ONCE
Status: COMPLETED | OUTPATIENT
Start: 2021-06-26 | End: 2021-06-26

## 2021-06-26 RX ADMIN — ASPIRIN 81 MG CHEWABLE TABLET 324 MG: 81 TABLET CHEWABLE at 17:41

## 2021-06-26 RX ADMIN — METOPROLOL TARTRATE 25 MG: 25 TABLET, FILM COATED ORAL at 22:23

## 2021-06-26 RX ADMIN — NITROGLYCERIN 0.4 MG: 0.4 TABLET SUBLINGUAL at 17:43

## 2021-06-26 RX ADMIN — OXYBUTYNIN CHLORIDE 5 MG: 5 TABLET ORAL at 22:22

## 2021-06-26 RX ADMIN — DOXYCYCLINE 100 MG: 100 TABLET, FILM COATED ORAL at 22:23

## 2021-06-26 RX ADMIN — FUROSEMIDE 40 MG: 10 INJECTION, SOLUTION INTRAMUSCULAR; INTRAVENOUS at 16:16

## 2021-06-26 RX ADMIN — Medication 10 ML: at 22:23

## 2021-06-27 ENCOUNTER — APPOINTMENT (OUTPATIENT)
Dept: CARDIOLOGY | Facility: HOSPITAL | Age: 86
End: 2021-06-27

## 2021-06-27 LAB
ANION GAP SERPL CALCULATED.3IONS-SCNC: 11 MMOL/L (ref 5–15)
BASOPHILS # BLD AUTO: 0 10*3/MM3 (ref 0–0.2)
BASOPHILS NFR BLD AUTO: 0.5 % (ref 0–1.5)
BH CV ECHO MEAS - BSA(HAYCOCK): 2.2 M^2
BH CV ECHO MEAS - BSA: 2 M^2
BH CV ECHO MEAS - BZI_BMI: 37.6 KILOGRAMS/M^2
BH CV ECHO MEAS - BZI_METRIC_HEIGHT: 162.6 CM
BH CV ECHO MEAS - BZI_METRIC_WEIGHT: 99.3 KG
BH CV ECHO MEAS - EDV(CUBED): 80.5 ML
BH CV ECHO MEAS - EDV(MOD-SP2): 102.6 ML
BH CV ECHO MEAS - EDV(MOD-SP4): 78.9 ML
BH CV ECHO MEAS - EDV(TEICH): 83.9 ML
BH CV ECHO MEAS - EF(CUBED): 49.2 %
BH CV ECHO MEAS - EF(MOD-SP2): 49.3 %
BH CV ECHO MEAS - EF(MOD-SP4): 44.4 %
BH CV ECHO MEAS - EF(TEICH): 41.6 %
BH CV ECHO MEAS - ESV(CUBED): 40.9 ML
BH CV ECHO MEAS - ESV(MOD-SP2): 52 ML
BH CV ECHO MEAS - ESV(MOD-SP4): 43.9 ML
BH CV ECHO MEAS - ESV(TEICH): 49 ML
BH CV ECHO MEAS - FS: 20.2 %
BH CV ECHO MEAS - IVS/LVPW: 1.1
BH CV ECHO MEAS - IVSD: 1.7 CM
BH CV ECHO MEAS - LV DIASTOLIC VOL/BSA (35-75): 38.8 ML/M^2
BH CV ECHO MEAS - LV MASS(C)D: 276.5 GRAMS
BH CV ECHO MEAS - LV MASS(C)DI: 136 GRAMS/M^2
BH CV ECHO MEAS - LV SYSTOLIC VOL/BSA (12-30): 21.6 ML/M^2
BH CV ECHO MEAS - LVIDD: 4.3 CM
BH CV ECHO MEAS - LVIDS: 3.4 CM
BH CV ECHO MEAS - LVPWD: 1.5 CM
BH CV ECHO MEAS - RVDD: 3 CM
BH CV ECHO MEAS - SI(CUBED): 19.5 ML/M^2
BH CV ECHO MEAS - SI(MOD-SP2): 24.9 ML/M^2
BH CV ECHO MEAS - SI(MOD-SP4): 17.2 ML/M^2
BH CV ECHO MEAS - SI(TEICH): 17.2 ML/M^2
BH CV ECHO MEAS - SV(CUBED): 39.6 ML
BH CV ECHO MEAS - SV(MOD-SP2): 50.6 ML
BH CV ECHO MEAS - SV(MOD-SP4): 35 ML
BH CV ECHO MEAS - SV(TEICH): 34.9 ML
BUN SERPL-MCNC: 22 MG/DL (ref 8–23)
BUN/CREAT SERPL: 20.8 (ref 7–25)
CA-I SERPL ISE-MCNC: 1.2 MMOL/L (ref 1.2–1.3)
CALCIUM SPEC-SCNC: 8.7 MG/DL (ref 8.6–10.5)
CHLORIDE SERPL-SCNC: 101 MMOL/L (ref 98–107)
CHOLEST SERPL-MCNC: 209 MG/DL (ref 0–200)
CK SERPL-CCNC: 118 U/L (ref 20–180)
CO2 SERPL-SCNC: 30 MMOL/L (ref 22–29)
CREAT SERPL-MCNC: 1.06 MG/DL (ref 0.57–1)
DEPRECATED RDW RBC AUTO: 45.9 FL (ref 37–54)
EOSINOPHIL # BLD AUTO: 0.3 10*3/MM3 (ref 0–0.4)
EOSINOPHIL NFR BLD AUTO: 3.8 % (ref 0.3–6.2)
ERYTHROCYTE [DISTWIDTH] IN BLOOD BY AUTOMATED COUNT: 13.7 % (ref 12.3–15.4)
FERRITIN SERPL-MCNC: 663.5 NG/ML (ref 13–150)
GFR SERPL CREATININE-BSD FRML MDRD: 49 ML/MIN/1.73
GLUCOSE BLDC GLUCOMTR-MCNC: 121 MG/DL (ref 70–105)
GLUCOSE BLDC GLUCOMTR-MCNC: 142 MG/DL (ref 70–105)
GLUCOSE BLDC GLUCOMTR-MCNC: 150 MG/DL (ref 70–105)
GLUCOSE BLDC GLUCOMTR-MCNC: 197 MG/DL (ref 70–105)
GLUCOSE SERPL-MCNC: 110 MG/DL (ref 65–99)
HCT VFR BLD AUTO: 38.9 % (ref 34–46.6)
HDLC SERPL-MCNC: 48 MG/DL (ref 40–60)
HGB BLD-MCNC: 13.1 G/DL (ref 12–15.9)
LDLC SERPL CALC-MCNC: 136 MG/DL (ref 0–100)
LDLC/HDLC SERPL: 2.78 {RATIO}
LYMPHOCYTES # BLD AUTO: 2 10*3/MM3 (ref 0.7–3.1)
LYMPHOCYTES NFR BLD AUTO: 27.1 % (ref 19.6–45.3)
MAGNESIUM SERPL-MCNC: 2.2 MG/DL (ref 1.6–2.4)
MCH RBC QN AUTO: 31.5 PG (ref 26.6–33)
MCHC RBC AUTO-ENTMCNC: 33.6 G/DL (ref 31.5–35.7)
MCV RBC AUTO: 93.8 FL (ref 79–97)
MONOCYTES # BLD AUTO: 1 10*3/MM3 (ref 0.1–0.9)
MONOCYTES NFR BLD AUTO: 14 % (ref 5–12)
NEUTROPHILS NFR BLD AUTO: 3.9 10*3/MM3 (ref 1.7–7)
NEUTROPHILS NFR BLD AUTO: 54.6 % (ref 42.7–76)
NRBC BLD AUTO-RTO: 0.1 /100 WBC (ref 0–0.2)
PHOSPHATE SERPL-MCNC: 3.8 MG/DL (ref 2.5–4.5)
PLATELET # BLD AUTO: 310 10*3/MM3 (ref 140–450)
PMV BLD AUTO: 8.6 FL (ref 6–12)
POTASSIUM SERPL-SCNC: 3.6 MMOL/L (ref 3.5–5.2)
PROCALCITONIN SERPL-MCNC: 0.07 NG/ML (ref 0–0.25)
QT INTERVAL: 506 MS
QT INTERVAL: 576 MS
QT INTERVAL: 629 MS
RBC # BLD AUTO: 4.15 10*6/MM3 (ref 3.77–5.28)
SODIUM SERPL-SCNC: 142 MMOL/L (ref 136–145)
T4 FREE SERPL-MCNC: 1.32 NG/DL (ref 0.93–1.7)
TRIGL SERPL-MCNC: 138 MG/DL (ref 0–150)
TROPONIN T SERPL-MCNC: 0.02 NG/ML (ref 0–0.03)
TSH SERPL DL<=0.05 MIU/L-ACNC: 3.2 UIU/ML (ref 0.27–4.2)
VLDLC SERPL-MCNC: 25 MG/DL (ref 5–40)
WBC # BLD AUTO: 7.2 10*3/MM3 (ref 3.4–10.8)

## 2021-06-27 PROCEDURE — 82550 ASSAY OF CK (CPK): CPT | Performed by: INTERNAL MEDICINE

## 2021-06-27 PROCEDURE — 82962 GLUCOSE BLOOD TEST: CPT

## 2021-06-27 PROCEDURE — 93010 ELECTROCARDIOGRAM REPORT: CPT | Performed by: INTERNAL MEDICINE

## 2021-06-27 PROCEDURE — G0378 HOSPITAL OBSERVATION PER HR: HCPCS

## 2021-06-27 PROCEDURE — 99214 OFFICE O/P EST MOD 30 MIN: CPT | Performed by: INTERNAL MEDICINE

## 2021-06-27 PROCEDURE — 96376 TX/PRO/DX INJ SAME DRUG ADON: CPT

## 2021-06-27 PROCEDURE — 85025 COMPLETE CBC W/AUTO DIFF WBC: CPT | Performed by: INTERNAL MEDICINE

## 2021-06-27 PROCEDURE — 99225 PR SBSQ OBSERVATION CARE/DAY 25 MINUTES: CPT | Performed by: INTERNAL MEDICINE

## 2021-06-27 PROCEDURE — 80061 LIPID PANEL: CPT | Performed by: INTERNAL MEDICINE

## 2021-06-27 PROCEDURE — 84145 PROCALCITONIN (PCT): CPT | Performed by: INTERNAL MEDICINE

## 2021-06-27 PROCEDURE — 84443 ASSAY THYROID STIM HORMONE: CPT | Performed by: INTERNAL MEDICINE

## 2021-06-27 PROCEDURE — 93308 TTE F-UP OR LMTD: CPT

## 2021-06-27 PROCEDURE — 25010000002 FUROSEMIDE PER 20 MG: Performed by: INTERNAL MEDICINE

## 2021-06-27 PROCEDURE — 84484 ASSAY OF TROPONIN QUANT: CPT | Performed by: INTERNAL MEDICINE

## 2021-06-27 PROCEDURE — 83735 ASSAY OF MAGNESIUM: CPT | Performed by: INTERNAL MEDICINE

## 2021-06-27 PROCEDURE — 84439 ASSAY OF FREE THYROXINE: CPT | Performed by: INTERNAL MEDICINE

## 2021-06-27 PROCEDURE — 82330 ASSAY OF CALCIUM: CPT | Performed by: INTERNAL MEDICINE

## 2021-06-27 PROCEDURE — 80048 BASIC METABOLIC PNL TOTAL CA: CPT | Performed by: INTERNAL MEDICINE

## 2021-06-27 PROCEDURE — 93005 ELECTROCARDIOGRAM TRACING: CPT | Performed by: INTERNAL MEDICINE

## 2021-06-27 PROCEDURE — 82728 ASSAY OF FERRITIN: CPT | Performed by: INTERNAL MEDICINE

## 2021-06-27 PROCEDURE — 84100 ASSAY OF PHOSPHORUS: CPT | Performed by: INTERNAL MEDICINE

## 2021-06-27 PROCEDURE — 93308 TTE F-UP OR LMTD: CPT | Performed by: INTERNAL MEDICINE

## 2021-06-27 RX ADMIN — DOXYCYCLINE 100 MG: 100 TABLET, FILM COATED ORAL at 09:47

## 2021-06-27 RX ADMIN — ASPIRIN 81 MG CHEWABLE TABLET 81 MG: 81 TABLET CHEWABLE at 09:47

## 2021-06-27 RX ADMIN — FUROSEMIDE 40 MG: 10 INJECTION, SOLUTION INTRAMUSCULAR; INTRAVENOUS at 09:47

## 2021-06-27 RX ADMIN — FUROSEMIDE 40 MG: 10 INJECTION, SOLUTION INTRAMUSCULAR; INTRAVENOUS at 17:43

## 2021-06-27 RX ADMIN — LISINOPRIL 40 MG: 20 TABLET ORAL at 09:47

## 2021-06-27 RX ADMIN — METOPROLOL TARTRATE 25 MG: 25 TABLET, FILM COATED ORAL at 09:47

## 2021-06-27 RX ADMIN — OXYBUTYNIN CHLORIDE 5 MG: 5 TABLET ORAL at 09:47

## 2021-06-27 RX ADMIN — Medication 10 ML: at 09:47

## 2021-06-27 RX ADMIN — METOPROLOL TARTRATE 25 MG: 25 TABLET, FILM COATED ORAL at 21:10

## 2021-06-27 RX ADMIN — NITROGLYCERIN 0.4 MG: 0.4 TABLET SUBLINGUAL at 05:30

## 2021-06-27 RX ADMIN — OXYBUTYNIN CHLORIDE 5 MG: 5 TABLET ORAL at 21:10

## 2021-06-27 RX ADMIN — Medication 10 ML: at 21:09

## 2021-06-28 VITALS
WEIGHT: 217.15 LBS | HEART RATE: 55 BPM | RESPIRATION RATE: 16 BRPM | OXYGEN SATURATION: 94 % | SYSTOLIC BLOOD PRESSURE: 121 MMHG | BODY MASS INDEX: 37.07 KG/M2 | HEIGHT: 64 IN | TEMPERATURE: 98.6 F | DIASTOLIC BLOOD PRESSURE: 70 MMHG

## 2021-06-28 LAB
ANION GAP SERPL CALCULATED.3IONS-SCNC: 10 MMOL/L (ref 5–15)
BASOPHILS # BLD AUTO: 0.1 10*3/MM3 (ref 0–0.2)
BASOPHILS NFR BLD AUTO: 0.9 % (ref 0–1.5)
BUN SERPL-MCNC: 29 MG/DL (ref 8–23)
BUN/CREAT SERPL: 24.2 (ref 7–25)
CALCIUM SPEC-SCNC: 9 MG/DL (ref 8.6–10.5)
CHLORIDE SERPL-SCNC: 100 MMOL/L (ref 98–107)
CO2 SERPL-SCNC: 31 MMOL/L (ref 22–29)
CREAT SERPL-MCNC: 1.2 MG/DL (ref 0.57–1)
DEPRECATED RDW RBC AUTO: 44.6 FL (ref 37–54)
EOSINOPHIL # BLD AUTO: 0.4 10*3/MM3 (ref 0–0.4)
EOSINOPHIL NFR BLD AUTO: 4.2 % (ref 0.3–6.2)
ERYTHROCYTE [DISTWIDTH] IN BLOOD BY AUTOMATED COUNT: 13.4 % (ref 12.3–15.4)
GFR SERPL CREATININE-BSD FRML MDRD: 43 ML/MIN/1.73
GLUCOSE SERPL-MCNC: 117 MG/DL (ref 65–99)
HCT VFR BLD AUTO: 38.9 % (ref 34–46.6)
HGB BLD-MCNC: 13.1 G/DL (ref 12–15.9)
LYMPHOCYTES # BLD AUTO: 2.4 10*3/MM3 (ref 0.7–3.1)
LYMPHOCYTES NFR BLD AUTO: 27.2 % (ref 19.6–45.3)
MAGNESIUM SERPL-MCNC: 2.2 MG/DL (ref 1.6–2.4)
MCH RBC QN AUTO: 31.5 PG (ref 26.6–33)
MCHC RBC AUTO-ENTMCNC: 33.6 G/DL (ref 31.5–35.7)
MCV RBC AUTO: 93.7 FL (ref 79–97)
MONOCYTES # BLD AUTO: 1.2 10*3/MM3 (ref 0.1–0.9)
MONOCYTES NFR BLD AUTO: 13.8 % (ref 5–12)
NEUTROPHILS NFR BLD AUTO: 4.7 10*3/MM3 (ref 1.7–7)
NEUTROPHILS NFR BLD AUTO: 53.9 % (ref 42.7–76)
NRBC BLD AUTO-RTO: 0 /100 WBC (ref 0–0.2)
PHOSPHATE SERPL-MCNC: 4.6 MG/DL (ref 2.5–4.5)
PLATELET # BLD AUTO: 288 10*3/MM3 (ref 140–450)
PMV BLD AUTO: 8.9 FL (ref 6–12)
POTASSIUM SERPL-SCNC: 4.5 MMOL/L (ref 3.5–5.2)
RBC # BLD AUTO: 4.15 10*6/MM3 (ref 3.77–5.28)
SODIUM SERPL-SCNC: 141 MMOL/L (ref 136–145)
WBC # BLD AUTO: 8.8 10*3/MM3 (ref 3.4–10.8)

## 2021-06-28 PROCEDURE — 97163 PT EVAL HIGH COMPLEX 45 MIN: CPT

## 2021-06-28 PROCEDURE — 84100 ASSAY OF PHOSPHORUS: CPT | Performed by: INTERNAL MEDICINE

## 2021-06-28 PROCEDURE — 25010000002 FUROSEMIDE PER 20 MG: Performed by: INTERNAL MEDICINE

## 2021-06-28 PROCEDURE — 85025 COMPLETE CBC W/AUTO DIFF WBC: CPT | Performed by: INTERNAL MEDICINE

## 2021-06-28 PROCEDURE — 99214 OFFICE O/P EST MOD 30 MIN: CPT | Performed by: INTERNAL MEDICINE

## 2021-06-28 PROCEDURE — 83735 ASSAY OF MAGNESIUM: CPT | Performed by: INTERNAL MEDICINE

## 2021-06-28 PROCEDURE — G0378 HOSPITAL OBSERVATION PER HR: HCPCS

## 2021-06-28 PROCEDURE — 99217 PR OBSERVATION CARE DISCHARGE MANAGEMENT: CPT | Performed by: INTERNAL MEDICINE

## 2021-06-28 PROCEDURE — 96376 TX/PRO/DX INJ SAME DRUG ADON: CPT

## 2021-06-28 PROCEDURE — 80048 BASIC METABOLIC PNL TOTAL CA: CPT | Performed by: INTERNAL MEDICINE

## 2021-06-28 RX ORDER — NITROGLYCERIN 0.4 MG/1
0.4 TABLET SUBLINGUAL
Qty: 30 TABLET | Refills: 12 | Status: SHIPPED | OUTPATIENT
Start: 2021-06-28 | End: 2022-05-12 | Stop reason: SDUPTHER

## 2021-06-28 RX ORDER — DOXYCYCLINE 100 MG/1
100 TABLET ORAL EVERY 12 HOURS SCHEDULED
Qty: 10 TABLET | Refills: 0 | Status: SHIPPED | OUTPATIENT
Start: 2021-06-28 | End: 2021-07-03

## 2021-06-28 RX ORDER — FUROSEMIDE 20 MG/1
20 TABLET ORAL 2 TIMES DAILY
Qty: 60 TABLET | Refills: 0 | Status: SHIPPED | OUTPATIENT
Start: 2021-06-28

## 2021-06-28 RX ORDER — POTASSIUM CHLORIDE 750 MG/1
10 TABLET, FILM COATED, EXTENDED RELEASE ORAL DAILY
Qty: 30 TABLET | Refills: 0 | Status: SHIPPED | OUTPATIENT
Start: 2021-06-28

## 2021-06-28 RX ADMIN — Medication 10 ML: at 08:57

## 2021-06-28 RX ADMIN — ASPIRIN 81 MG CHEWABLE TABLET 81 MG: 81 TABLET CHEWABLE at 08:57

## 2021-06-28 RX ADMIN — FUROSEMIDE 40 MG: 10 INJECTION, SOLUTION INTRAMUSCULAR; INTRAVENOUS at 08:57

## 2021-06-28 RX ADMIN — LISINOPRIL 40 MG: 20 TABLET ORAL at 08:57

## 2021-06-28 RX ADMIN — OXYBUTYNIN CHLORIDE 5 MG: 5 TABLET ORAL at 08:57

## 2021-06-28 RX ADMIN — POLYETHYLENE GLYCOL 3350 17 G: 17 POWDER, FOR SOLUTION ORAL at 08:57

## 2021-07-07 ENCOUNTER — OFFICE VISIT (OUTPATIENT)
Dept: CARDIOLOGY | Facility: CLINIC | Age: 86
End: 2021-07-07

## 2021-07-07 VITALS
HEART RATE: 50 BPM | SYSTOLIC BLOOD PRESSURE: 149 MMHG | WEIGHT: 218 LBS | OXYGEN SATURATION: 96 % | HEIGHT: 64 IN | DIASTOLIC BLOOD PRESSURE: 79 MMHG | BODY MASS INDEX: 37.22 KG/M2

## 2021-07-07 DIAGNOSIS — I25.10 CORONARY ARTERY DISEASE INVOLVING NATIVE CORONARY ARTERY OF NATIVE HEART WITHOUT ANGINA PECTORIS: Primary | ICD-10-CM

## 2021-07-07 DIAGNOSIS — I10 ESSENTIAL HYPERTENSION: ICD-10-CM

## 2021-07-07 DIAGNOSIS — E78.00 PURE HYPERCHOLESTEROLEMIA: ICD-10-CM

## 2021-07-07 DIAGNOSIS — I50.23 ACUTE ON CHRONIC SYSTOLIC CONGESTIVE HEART FAILURE (HCC): ICD-10-CM

## 2021-07-07 PROCEDURE — 99214 OFFICE O/P EST MOD 30 MIN: CPT | Performed by: INTERNAL MEDICINE

## 2021-07-07 NOTE — PROGRESS NOTES
"    Subjective:     Encounter Date:07/07/2021      Patient ID: Suzette Blandon is a 86 y.o. female.    Chief Complaint:  History of Present Illness 86-year-old white female with history of coronary disease and recent diagnosed congestive heart failure hypertension hyperlipidemia presents to my office for follow-up.  Patient is currently stable without evidence of chest pain but has some shortness of breath exacerbated with any PND orthopnea.  No palpitation dizziness syncope she has some mild swelling of the feet.  She is taking her medicines regularly but she does not smoke anymore.    The following portions of the patient's history were reviewed and updated as appropriate: allergies, current medications, past family history, past medical history, past social history, past surgical history and problem list.  Past Medical History:   Diagnosis Date   • Arthritis    • Coronary artery disease    • Elevated cholesterol    • GERD (gastroesophageal reflux disease)    • Hypertension      Past Surgical History:   Procedure Laterality Date   • ABDOMINAL SURGERY     • CARDIAC CATHETERIZATION     • CARDIAC CATHETERIZATION N/A 6/19/2021    Procedure: Left Heart Cath;  Surgeon: Bennie Pace MD;  Location: [facility] CATH INVASIVE LOCATION;  Service: Cardiology;  Laterality: N/A;   • CARDIAC CATHETERIZATION N/A 6/19/2021    Procedure: Left ventriculography;  Surgeon: Bennie Pace MD;  Location: [facility] CATH INVASIVE LOCATION;  Service: Cardiology;  Laterality: N/A;   • CARDIAC CATHETERIZATION N/A 6/19/2021    Procedure: Coronary angiography;  Surgeon: Bennie Pace MD;  Location: [facility] CATH INVASIVE LOCATION;  Service: Cardiology;  Laterality: N/A;   • EYE SURGERY      CATARACT SURGERY   • HYSTERECTOMY     • JOINT REPLACEMENT      RIGHT HIP     /79   Pulse 50   Ht 162.6 cm (64\")   Wt 98.9 kg (218 lb)   SpO2 96%   BMI 37.42 kg/m²   Family History   Problem Relation Age of Onset   • Heart disease Mother    • COPD Father  "   • Heart disease Father    • Hypertension Father        Current Outpatient Medications:   •  aspirin 81 MG chewable tablet, Chew 81 mg Daily., Disp: , Rfl:   •  furosemide (LASIX) 20 MG tablet, Take 1 tablet by mouth 2 (Two) Times a Day., Disp: 60 tablet, Rfl: 0  •  lisinopril (PRINIVIL,ZESTRIL) 40 MG tablet, Take 40 mg by mouth Daily., Disp: , Rfl:   •  Misc Natural Products (Osteo Bi-Flex Joint Shield) tablet, Take 1 tablet by mouth 2 (two) times a day. Osteo Bi-Flex with Turmeric, Disp: , Rfl:   •  multivitamin with minerals (Centrum Adults) tablet tablet, Take 1 tablet by mouth Daily., Disp: , Rfl:   •  nitroglycerin (NITROSTAT) 0.4 MG SL tablet, Place 1 tablet under the tongue Every 5 (Five) Minutes As Needed for Chest Pain for up to 1 dose. Take no more than 3 doses in 15 minutes., Disp: 30 tablet, Rfl: 12  •  oxybutynin (DITROPAN) 5 MG tablet, Take 5 mg by mouth 2 (Two) Times a Day., Disp: , Rfl:   •  polyethylene glycol (MIRALAX) 17 GM/SCOOP powder, Take 17 g by mouth Daily., Disp: 510 g, Rfl: 0  •  potassium chloride 10 MEQ CR tablet, Take 1 tablet by mouth Daily., Disp: 30 tablet, Rfl: 0  •  metoprolol tartrate (LOPRESSOR) 25 MG tablet, Take 1 tablet by mouth Every 12 (Twelve) Hours., Disp: 180 tablet, Rfl: 3  Allergies   Allergen Reactions   • Ether Unknown - High Severity   • Oxycodone Unknown - Low Severity   • Penicillins Unknown - Low Severity   • Polyoxyethylene Lauryl Ether [Sorbitan] Unknown - High Severity   • Statins Unknown - High Severity   Current outpatient and discharge medications have been reconciled for the patient.  Reviewed by: Bennie Pace MD    Social History     Socioeconomic History   • Marital status:      Spouse name: Not on file   • Number of children: Not on file   • Years of education: Not on file   • Highest education level: Not on file   Tobacco Use   • Smoking status: Former Smoker   • Smokeless tobacco: Never Used   Vaping Use   • Vaping Use: Never used   Substance  and Sexual Activity   • Alcohol use: Never   • Drug use: Never   • Sexual activity: Never     Review of Systems   Constitutional: Positive for malaise/fatigue. Negative for fever.   Cardiovascular: Positive for leg swelling. Negative for chest pain, dyspnea on exertion and palpitations.   Respiratory: Positive for shortness of breath. Negative for cough.    Skin: Negative for rash.   Gastrointestinal: Negative for abdominal pain, nausea and vomiting.   Neurological: Negative for focal weakness, headaches, light-headedness and numbness.   All other systems reviewed and are negative.             Objective:     Constitutional:       Appearance: Well-developed.   Eyes:      General: No scleral icterus.     Conjunctiva/sclera: Conjunctivae normal.   HENT:      Head: Normocephalic and atraumatic.   Neck:      Vascular: No carotid bruit or JVD.   Pulmonary:      Effort: Pulmonary effort is normal.      Breath sounds: Normal breath sounds. No wheezing. No rales.   Cardiovascular:      Normal rate. Regular rhythm.   Pulses:     Intact distal pulses.   Abdominal:      General: Bowel sounds are normal.      Palpations: Abdomen is soft.   Musculoskeletal:      Cervical back: Normal range of motion and neck supple. Skin:     General: Skin is warm and dry.      Findings: No rash.   Neurological:      Mental Status: Alert.       Procedures    Lab Review:         MDM #1 coronary disease  Patient had non-STEMI but had nonobstructive disease with a 50% lesion in the diagonal branch but has severe LV dysfunction  2.  Congestive heart failure  Patient has stress-induced cardiomyopathy with a EF of about 20 to 25% is currently on medical therapy with beta-blockers and ACE inhibitor's and she is doing well  3.  Hypertension excellent patient blood pressures currently stable on medication  4 hyperlipidemia  Patient lipid levels are followed by the primary care doctor.

## 2021-10-06 ENCOUNTER — OFFICE VISIT (OUTPATIENT)
Dept: CARDIOLOGY | Facility: CLINIC | Age: 86
End: 2021-10-06

## 2021-10-06 VITALS
DIASTOLIC BLOOD PRESSURE: 67 MMHG | OXYGEN SATURATION: 99 % | BODY MASS INDEX: 36.11 KG/M2 | HEIGHT: 64 IN | HEART RATE: 43 BPM | SYSTOLIC BLOOD PRESSURE: 157 MMHG | WEIGHT: 211.5 LBS

## 2021-10-06 DIAGNOSIS — I10 ESSENTIAL HYPERTENSION: ICD-10-CM

## 2021-10-06 DIAGNOSIS — E78.00 PURE HYPERCHOLESTEROLEMIA: ICD-10-CM

## 2021-10-06 DIAGNOSIS — I25.10 CORONARY ARTERY DISEASE INVOLVING NATIVE CORONARY ARTERY OF NATIVE HEART WITHOUT ANGINA PECTORIS: Primary | ICD-10-CM

## 2021-10-06 PROCEDURE — 99214 OFFICE O/P EST MOD 30 MIN: CPT | Performed by: INTERNAL MEDICINE

## 2021-10-06 NOTE — PROGRESS NOTES
"    Subjective:     Encounter Date:10/06/2021      Patient ID: Suzette Blandon is a 87 y.o. female.    Chief Complaint: Coronary artery disease  History of Present Illness 87-year-old white female with history of congestive heart failure coronary disease hypertension hyperlipidemia presents to my office for follow-up.  Patient is currently stable without is no chest pain but has some shortness of breath with exertion but no complains any PND orthopnea.  No palpitation dizziness syncope or swelling of the feet.  Patient has been taking all meds regularly.  Patient does not smoke.  Patient is trying exercise regularly patient follows a good diet    The following portions of the patient's history were reviewed and updated as appropriate: allergies, current medications, past family history, past medical history, past social history, past surgical history and problem list.  Past Medical History:   Diagnosis Date   • Arthritis    • Coronary artery disease    • Elevated cholesterol    • GERD (gastroesophageal reflux disease)    • Hypertension      Past Surgical History:   Procedure Laterality Date   • ABDOMINAL SURGERY     • CARDIAC CATHETERIZATION     • CARDIAC CATHETERIZATION N/A 6/19/2021    Procedure: Left Heart Cath;  Surgeon: Bennie Pace MD;  Location: [facility] CATH INVASIVE LOCATION;  Service: Cardiology;  Laterality: N/A;   • CARDIAC CATHETERIZATION N/A 6/19/2021    Procedure: Left ventriculography;  Surgeon: Bennie Pace MD;  Location: [facility] CATH INVASIVE LOCATION;  Service: Cardiology;  Laterality: N/A;   • CARDIAC CATHETERIZATION N/A 6/19/2021    Procedure: Coronary angiography;  Surgeon: Bennie Pace MD;  Location: [facility] CATH INVASIVE LOCATION;  Service: Cardiology;  Laterality: N/A;   • EYE SURGERY      CATARACT SURGERY   • HYSTERECTOMY     • JOINT REPLACEMENT      RIGHT HIP     /67 (BP Location: Left arm, Patient Position: Sitting, Cuff Size: Adult)   Pulse (!) 43   Ht 162.6 cm (64\")   Wt 95.9 " kg (211 lb 8 oz)   SpO2 99%   BMI 36.30 kg/m²   Family History   Problem Relation Age of Onset   • Heart disease Mother    • COPD Father    • Heart disease Father    • Hypertension Father        Current Outpatient Medications:   •  aspirin 81 MG chewable tablet, Chew 81 mg Daily., Disp: , Rfl:   •  furosemide (LASIX) 20 MG tablet, Take 1 tablet by mouth 2 (Two) Times a Day., Disp: 60 tablet, Rfl: 0  •  lisinopril (PRINIVIL,ZESTRIL) 40 MG tablet, Take 40 mg by mouth Daily., Disp: , Rfl:   •  metoprolol tartrate (LOPRESSOR) 25 MG tablet, Take 1 tablet by mouth Every 12 (Twelve) Hours., Disp: 180 tablet, Rfl: 3  •  Misc Natural Products (Osteo Bi-Flex Joint Shield) tablet, Take 1 tablet by mouth 2 (two) times a day. Osteo Bi-Flex with Turmeric, Disp: , Rfl:   •  multivitamin with minerals (Centrum Adults) tablet tablet, Take 1 tablet by mouth Daily., Disp: , Rfl:   •  nitroglycerin (NITROSTAT) 0.4 MG SL tablet, Place 1 tablet under the tongue Every 5 (Five) Minutes As Needed for Chest Pain for up to 1 dose. Take no more than 3 doses in 15 minutes., Disp: 30 tablet, Rfl: 12  •  oxybutynin (DITROPAN) 5 MG tablet, Take 5 mg by mouth 2 (Two) Times a Day., Disp: , Rfl:   •  potassium chloride 10 MEQ CR tablet, Take 1 tablet by mouth Daily., Disp: 30 tablet, Rfl: 0  Allergies   Allergen Reactions   • Ether Unknown - High Severity   • Oxycodone Unknown - Low Severity   • Penicillins Unknown - Low Severity   • Polyoxyethylene Lauryl Ether [Sorbitan] Unknown - High Severity   • Statins Unknown - High Severity     Social History     Socioeconomic History   • Marital status:      Spouse name: Not on file   • Number of children: Not on file   • Years of education: Not on file   • Highest education level: Not on file   Tobacco Use   • Smoking status: Former Smoker   • Smokeless tobacco: Never Used   Vaping Use   • Vaping Use: Never used   Substance and Sexual Activity   • Alcohol use: Never   • Drug use: Never   • Sexual  activity: Never     Review of Systems   Constitutional: Negative for fever and malaise/fatigue.   Cardiovascular: Negative for chest pain, dyspnea on exertion and palpitations.   Respiratory: Positive for shortness of breath. Negative for cough.    Skin: Negative for rash.   Gastrointestinal: Negative for abdominal pain, nausea and vomiting.   Neurological: Positive for numbness. Negative for focal weakness and headaches.   All other systems reviewed and are negative.             Objective:     Constitutional:       Appearance: Well-developed.   Eyes:      General: No scleral icterus.     Conjunctiva/sclera: Conjunctivae normal.   HENT:      Head: Normocephalic and atraumatic.   Neck:      Vascular: No carotid bruit or JVD.   Pulmonary:      Effort: Pulmonary effort is normal.      Breath sounds: Normal breath sounds. No wheezing. No rales.   Cardiovascular:      Normal rate. Regular rhythm.      Murmurs: There is a systolic murmur.   Pulses:     Intact distal pulses.   Abdominal:      General: Bowel sounds are normal.      Palpations: Abdomen is soft.   Musculoskeletal:      Cervical back: Normal range of motion and neck supple. Skin:     General: Skin is warm and dry.      Findings: No rash.   Neurological:      Mental Status: Alert.       Procedures    Lab Review:         MDM plan #1 coronary disease  Patient has nonobstructive disease now with mild LV dysfunction is currently stable on medications  2.  Congestive heart failure  Patient has LV systolic dysfunction with EF of 40 to 45% and is on metoprolol and lisinopril  3.  Hyperlipidemia  Patient's lipid levels are followed by the primary care doctor  4.  Hypertension  Patient blood pressure currently stable on medications    Patient's previous medical records, labs, and EKG were reviewed and discussed with the patient at today's visit.

## 2022-04-19 NOTE — TELEPHONE ENCOUNTER
Rx Refill Note  Requested Prescriptions     Pending Prescriptions Disp Refills   • metoprolol tartrate (LOPRESSOR) 25 MG tablet [Pharmacy Med Name: METOPROLOL TARTRATE 25 MG TAB] 180 tablet 3     Sig: TAKE 1 TABLET BY MOUTH EVERY 12 HOURS      Last office visit with prescribing clinician: 10/6/2021      Next office visit with prescribing clinician: 5/12/2022            Rosanna Mejía MA  04/19/22, 10:43 EDT

## 2022-05-12 ENCOUNTER — OFFICE VISIT (OUTPATIENT)
Dept: CARDIOLOGY | Facility: CLINIC | Age: 87
End: 2022-05-12

## 2022-05-12 VITALS
WEIGHT: 220 LBS | SYSTOLIC BLOOD PRESSURE: 148 MMHG | HEIGHT: 64 IN | BODY MASS INDEX: 37.56 KG/M2 | HEART RATE: 49 BPM | DIASTOLIC BLOOD PRESSURE: 75 MMHG | OXYGEN SATURATION: 98 %

## 2022-05-12 DIAGNOSIS — I10 ESSENTIAL HYPERTENSION: ICD-10-CM

## 2022-05-12 DIAGNOSIS — E78.00 PURE HYPERCHOLESTEROLEMIA: ICD-10-CM

## 2022-05-12 DIAGNOSIS — I25.10 CORONARY ARTERY DISEASE INVOLVING NATIVE CORONARY ARTERY OF NATIVE HEART WITHOUT ANGINA PECTORIS: Primary | ICD-10-CM

## 2022-05-12 PROCEDURE — 99213 OFFICE O/P EST LOW 20 MIN: CPT | Performed by: INTERNAL MEDICINE

## 2022-05-12 RX ORDER — NITROGLYCERIN 0.4 MG/1
0.4 TABLET SUBLINGUAL
Qty: 25 TABLET | Refills: 0 | Status: SHIPPED | OUTPATIENT
Start: 2022-05-12 | End: 2022-06-13

## 2022-05-12 NOTE — PROGRESS NOTES
"    Subjective:     Encounter Date:05/12/2022      Patient ID: Suzette Blandon is a 87 y.o. female.    Chief Complaint:  History of Present Illness 87-year-old white female with history of coronary disease hypertension hyperlipidemia presents to my office for follow-up.  Patient is currently stable without any symptoms of chest pain or shortness of breath at rest on exertion.  No complains any PND orthopnea.  No palpitation dizziness syncope or swelling of the feet.  Patient has been taking her medicines regularly.    The following portions of the patient's history were reviewed and updated as appropriate: allergies, current medications, past family history, past medical history, past social history, past surgical history and problem list.  Past Medical History:   Diagnosis Date   • Arthritis    • Coronary artery disease    • Elevated cholesterol    • GERD (gastroesophageal reflux disease)    • Hypertension      Past Surgical History:   Procedure Laterality Date   • ABDOMINAL SURGERY     • CARDIAC CATHETERIZATION     • CARDIAC CATHETERIZATION N/A 6/19/2021    Procedure: Left Heart Cath;  Surgeon: Bennie Pace MD;  Location: [facility] CATH INVASIVE LOCATION;  Service: Cardiology;  Laterality: N/A;   • CARDIAC CATHETERIZATION N/A 6/19/2021    Procedure: Left ventriculography;  Surgeon: Bennie Pace MD;  Location: [facility] CATH INVASIVE LOCATION;  Service: Cardiology;  Laterality: N/A;   • CARDIAC CATHETERIZATION N/A 6/19/2021    Procedure: Coronary angiography;  Surgeon: Bennie Pace MD;  Location: [facility] CATH INVASIVE LOCATION;  Service: Cardiology;  Laterality: N/A;   • EYE SURGERY      CATARACT SURGERY   • HYSTERECTOMY     • JOINT REPLACEMENT      RIGHT HIP     /75 (BP Location: Left arm, Patient Position: Sitting)   Pulse (!) 49   Ht 162.6 cm (64\")   Wt 99.8 kg (220 lb)   SpO2 98%   BMI 37.76 kg/m²   Family History   Problem Relation Age of Onset   • Heart disease Mother    • COPD Father    • Heart " disease Father    • Hypertension Father        Current Outpatient Medications:   •  aspirin 81 MG chewable tablet, Chew 81 mg Daily., Disp: , Rfl:   •  furosemide (LASIX) 20 MG tablet, Take 1 tablet by mouth 2 (Two) Times a Day., Disp: 60 tablet, Rfl: 0  •  lisinopril (PRINIVIL,ZESTRIL) 40 MG tablet, Take 40 mg by mouth Daily., Disp: , Rfl:   •  metoprolol tartrate (LOPRESSOR) 25 MG tablet, TAKE 1 TABLET BY MOUTH EVERY 12 HOURS, Disp: 180 tablet, Rfl: 3  •  Misc Natural Products (Osteo Bi-Flex Joint Shield) tablet, Take 1 tablet by mouth 2 (two) times a day. Osteo Bi-Flex with Turmeric, Disp: , Rfl:   •  multivitamin with minerals tablet tablet, Take 1 tablet by mouth Daily., Disp: , Rfl:   •  nitroglycerin (NITROSTAT) 0.4 MG SL tablet, Place 1 tablet under the tongue Every 5 (Five) Minutes As Needed for Chest Pain for up to 1 dose. Take no more than 3 doses in 15 minutes., Disp: 25 tablet, Rfl: 0  •  oxybutynin (DITROPAN) 5 MG tablet, Take 5 mg by mouth 2 (Two) Times a Day., Disp: , Rfl:   •  potassium chloride 10 MEQ CR tablet, Take 1 tablet by mouth Daily., Disp: 30 tablet, Rfl: 0  Allergies   Allergen Reactions   • Ether Unknown - High Severity   • Oxycodone Unknown - Low Severity   • Penicillins Unknown - Low Severity   • Polyoxyethylene Lauryl Ether [Sorbitan] Unknown - High Severity   • Statins Unknown - High Severity     Social History     Socioeconomic History   • Marital status:    Tobacco Use   • Smoking status: Former Smoker   • Smokeless tobacco: Never Used   Vaping Use   • Vaping Use: Never used   Substance and Sexual Activity   • Alcohol use: Never   • Drug use: Never   • Sexual activity: Never     Review of Systems   Constitutional: Negative for fever and malaise/fatigue.   Cardiovascular: Negative for chest pain, dyspnea on exertion and palpitations.   Respiratory: Negative for cough and shortness of breath.    Skin: Negative for rash.   Gastrointestinal: Negative for abdominal pain, nausea  and vomiting.   Neurological: Negative for focal weakness and headaches.   All other systems reviewed and are negative.             Objective:     Constitutional:       Appearance: Well-developed.   Eyes:      General: No scleral icterus.     Conjunctiva/sclera: Conjunctivae normal.   HENT:      Head: Normocephalic and atraumatic.   Neck:      Vascular: No carotid bruit or JVD.   Pulmonary:      Effort: Pulmonary effort is normal.      Breath sounds: Normal breath sounds. No wheezing. No rales.   Cardiovascular:      Normal rate. Regular rhythm.   Pulses:     Intact distal pulses.   Abdominal:      General: Bowel sounds are normal.      Palpations: Abdomen is soft.   Musculoskeletal:      Cervical back: Normal range of motion and neck supple. Skin:     General: Skin is warm and dry.      Findings: No rash.   Neurological:      Mental Status: Alert.       Procedures    Lab Review:         MDM  #1 coronary disease  Patient has nonobstructive disease and is currently stable on medications  2.  Hypertension  Patient blood pressure currently stable on lisinopril and metoprolol  3.  Hyperlipidemia  Patient is on over-the-counter medicines and followed by the primary care doctor.  Patient's previous medical records, labs, and EKG were reviewed and discussed with the patient at today's visit.

## 2022-06-13 RX ORDER — NITROGLYCERIN 0.4 MG/1
TABLET SUBLINGUAL
Qty: 25 TABLET | Refills: 0 | Status: SHIPPED | OUTPATIENT
Start: 2022-06-13

## 2022-06-13 NOTE — TELEPHONE ENCOUNTER
Rx Refill Note  Requested Prescriptions     Pending Prescriptions Disp Refills   • nitroglycerin (NITROSTAT) 0.4 MG SL tablet [Pharmacy Med Name: NITROGLYCERIN 0.4 MG TABLET SL] 25 tablet 0     Sig: PLACE 1 TABLET UNDER THE TONGUE EVERY 5 (FIVE) MINUTES AS NEEDED FOR CHEST PAIN FOR UP TO 3 DOSES IN 15 MINUTES.      Last office visit with prescribing clinician: 5/12/2022      Next office visit with prescribing clinician: 1/18/2023            Jade Mckeon MA  06/13/22, 08:47 EDT

## 2022-12-02 ENCOUNTER — TELEPHONE (OUTPATIENT)
Dept: CARDIOLOGY | Facility: CLINIC | Age: 87
End: 2022-12-02

## 2022-12-02 RX ORDER — LISINOPRIL 20 MG/1
20 TABLET ORAL 2 TIMES DAILY
Qty: 180 TABLET | Refills: 3 | Status: SHIPPED | OUTPATIENT
Start: 2022-12-02 | End: 2023-01-26 | Stop reason: SDUPTHER

## 2022-12-02 NOTE — TELEPHONE ENCOUNTER
Contacted patient - advised medication change to 20mg BID on the lisinopril is fine. Keep log of BP readings for next 2 weeks and call back.   Sending in Rx.   Pt verbalized understanding         Rx Refill Note  Requested Prescriptions     Pending Prescriptions Disp Refills   • lisinopril (PRINIVIL,ZESTRIL) 20 MG tablet 180 tablet 3     Sig: Take 1 tablet by mouth 2 (Two) Times a Day.      Last office visit with prescribing clinician: 5/12/2022   Last telemedicine visit with prescribing clinician: 1/18/2023   Next office visit with prescribing clinician: 1/18/2023                         Would you like a call back once the refill request has been completed: [] Yes [] No    If the office needs to give you a call back, can they leave a voicemail: [] Yes [] No    Rachelle Mello MA  12/02/22, 14:33 EST

## 2022-12-02 NOTE — TELEPHONE ENCOUNTER
Patient called to say her BP has been going back up during the afternoon/evening.  Wanted to know if she could change her Lisinopril from 40mg QD to 20mg BID.   Pt states she has started cutting her 40mgs for the past 3 days and she feels a lot better, BP is more stabilized.   Advised I would send to provider to make sure ok with medication change and will send in refill.   Pt and son, Jerry, asking for call back with answer and confirmation that Rx was sent to [facility] in [facility].

## 2022-12-02 NOTE — TELEPHONE ENCOUNTER
Yes, that is fine for her to take 20 mg am then 20 mg pm, thank you. Please have her check her blood pressure for 2 weeks and call with readings so I know that change is enough as we can always change her HTN therapy further to keep her from being hypertensive,   Let me know if she has any further questions.

## 2023-01-18 ENCOUNTER — OFFICE VISIT (OUTPATIENT)
Dept: CARDIOLOGY | Facility: CLINIC | Age: 88
End: 2023-01-18
Payer: MEDICARE

## 2023-01-18 VITALS
SYSTOLIC BLOOD PRESSURE: 138 MMHG | HEIGHT: 64 IN | WEIGHT: 219 LBS | DIASTOLIC BLOOD PRESSURE: 61 MMHG | HEART RATE: 51 BPM | OXYGEN SATURATION: 98 % | BODY MASS INDEX: 37.39 KG/M2

## 2023-01-18 DIAGNOSIS — I50.22 CHRONIC SYSTOLIC CONGESTIVE HEART FAILURE: ICD-10-CM

## 2023-01-18 DIAGNOSIS — I10 ESSENTIAL HYPERTENSION: ICD-10-CM

## 2023-01-18 DIAGNOSIS — I25.10 CORONARY ARTERY DISEASE INVOLVING NATIVE CORONARY ARTERY OF NATIVE HEART WITHOUT ANGINA PECTORIS: Primary | ICD-10-CM

## 2023-01-18 DIAGNOSIS — E78.00 PURE HYPERCHOLESTEROLEMIA: ICD-10-CM

## 2023-01-18 PROCEDURE — 99214 OFFICE O/P EST MOD 30 MIN: CPT | Performed by: INTERNAL MEDICINE

## 2023-01-18 NOTE — PROGRESS NOTES
"    Subjective:     Encounter Date:01/18/2023      Patient ID: Suzette Blandon is a 88 y.o. female.    Chief Complaint:  History of Present Illness 88-year-old white female with history of coronary disease congestive heart failure history of hypertension hyperlipidemia presents to my office for follow-up.  Patient is currently stable without any symptoms of chest pain but has shortness of breath with exertion.  No complains of any PND orthopnea.  No palpitation dizziness syncope or swelling of the feet but she is taking her medicines regularly.  She does not smoke.    The following portions of the patient's history were reviewed and updated as appropriate: allergies, current medications, past family history, past medical history, past social history, past surgical history and problem list.  Past Medical History:   Diagnosis Date   • Arthritis    • Coronary artery disease    • Elevated cholesterol    • GERD (gastroesophageal reflux disease)    • Hypertension      Past Surgical History:   Procedure Laterality Date   • ABDOMINAL SURGERY     • CARDIAC CATHETERIZATION     • CARDIAC CATHETERIZATION N/A 6/19/2021    Procedure: Left Heart Cath;  Surgeon: Bennie Pace MD;  Location: [facility] CATH INVASIVE LOCATION;  Service: Cardiology;  Laterality: N/A;   • CARDIAC CATHETERIZATION N/A 6/19/2021    Procedure: Left ventriculography;  Surgeon: Bennie Pace MD;  Location: [facility] CATH INVASIVE LOCATION;  Service: Cardiology;  Laterality: N/A;   • CARDIAC CATHETERIZATION N/A 6/19/2021    Procedure: Coronary angiography;  Surgeon: Bennie Pace MD;  Location: [facility] CATH INVASIVE LOCATION;  Service: Cardiology;  Laterality: N/A;   • EYE SURGERY      CATARACT SURGERY   • HYSTERECTOMY     • JOINT REPLACEMENT      RIGHT HIP     /61   Pulse 51   Ht 162.6 cm (64.02\")   Wt 99.3 kg (219 lb)   SpO2 98%   BMI 37.57 kg/m²   Family History   Problem Relation Age of Onset   • Heart disease Mother    • COPD Father    • Heart " disease Father    • Hypertension Father        Current Outpatient Medications:   •  aspirin 81 MG chewable tablet, Chew 81 mg Daily., Disp: , Rfl:   •  furosemide (LASIX) 20 MG tablet, Take 1 tablet by mouth 2 (Two) Times a Day., Disp: 60 tablet, Rfl: 0  •  lisinopril (PRINIVIL,ZESTRIL) 20 MG tablet, Take 1 tablet by mouth 2 (Two) Times a Day., Disp: 180 tablet, Rfl: 3  •  metoprolol tartrate (LOPRESSOR) 25 MG tablet, TAKE 1 TABLET BY MOUTH EVERY 12 HOURS, Disp: 180 tablet, Rfl: 3  •  Misc Natural Products (Osteo Bi-Flex Joint Shield) tablet, Take 1 tablet by mouth 2 (two) times a day. Osteo Bi-Flex with Turmeric, Disp: , Rfl:   •  multivitamin with minerals tablet tablet, Take 1 tablet by mouth Daily., Disp: , Rfl:   •  nitroglycerin (NITROSTAT) 0.4 MG SL tablet, PLACE 1 TABLET UNDER THE TONGUE EVERY 5 (FIVE) MINUTES AS NEEDED FOR CHEST PAIN FOR UP TO 3 DOSES IN 15 MINUTES., Disp: 25 tablet, Rfl: 0  •  oxybutynin (DITROPAN) 5 MG tablet, Take 5 mg by mouth 2 (Two) Times a Day., Disp: , Rfl:   •  potassium chloride 10 MEQ CR tablet, Take 1 tablet by mouth Daily., Disp: 30 tablet, Rfl: 0  Allergies   Allergen Reactions   • Ether Unknown - High Severity   • Oxycodone Unknown - Low Severity   • Penicillins Unknown - Low Severity   • Polyoxyethylene Lauryl Ether [Sorbitan] Unknown - High Severity   • Statins Unknown - High Severity     Social History     Socioeconomic History   • Marital status:    Tobacco Use   • Smoking status: Former   • Smokeless tobacco: Never   Vaping Use   • Vaping Use: Never used   Substance and Sexual Activity   • Alcohol use: Never   • Drug use: Never   • Sexual activity: Never     Review of Systems   Constitutional: Negative for malaise/fatigue.   Cardiovascular: Negative for chest pain, dyspnea on exertion, leg swelling and palpitations.   Respiratory: Positive for shortness of breath. Negative for cough.    Gastrointestinal: Negative for abdominal pain, nausea and vomiting.    Neurological: Negative for dizziness, focal weakness, headaches, light-headedness and numbness.   All other systems reviewed and are negative.             Objective:     Constitutional:       Appearance: Well-developed.   Eyes:      General: No scleral icterus.     Conjunctiva/sclera: Conjunctivae normal.   HENT:      Head: Normocephalic and atraumatic.   Neck:      Vascular: No carotid bruit or JVD.   Pulmonary:      Effort: Pulmonary effort is normal.      Breath sounds: Normal breath sounds. No wheezing. No rales.   Cardiovascular:      Normal rate. Regular rhythm.      Murmurs: There is a systolic murmur.   Pulses:     Intact distal pulses.   Abdominal:      General: Bowel sounds are normal.      Palpations: Abdomen is soft.   Musculoskeletal:      Cervical back: Normal range of motion and neck supple. Skin:     General: Skin is warm and dry.      Findings: No rash.   Neurological:      Mental Status: Alert.       Procedures    Lab Review:         MDM  1.  Coronary disease  Patient has nonobstructive disease with LV dysfunction and is currently stable on medications including beta-blockers and lisinopril and furosemide  2.  Congestive heart failure  Patient is mild LV systolic function with an EF of 40 to 45% but is currently on adequate medical therapy  3.  Hypertension  Patient blood pressure is currently stable on metoprolol and lisinopril  4 hyperlipidemia  Patient is on over-the-counter medicines and followed by the primary care doctor.      Patient's previous medical records, labs, and EKG were reviewed and discussed with the patient at today's visit.

## 2023-01-26 RX ORDER — LISINOPRIL 20 MG/1
20 TABLET ORAL 2 TIMES DAILY
Qty: 180 TABLET | Refills: 3 | Status: SHIPPED | OUTPATIENT
Start: 2023-01-26

## 2023-01-26 NOTE — TELEPHONE ENCOUNTER
Rx Refill Note  Requested Prescriptions     Pending Prescriptions Disp Refills   • lisinopril (PRINIVIL,ZESTRIL) 20 MG tablet 180 tablet 3     Sig: Take 1 tablet by mouth 2 (Two) Times a Day.   • metoprolol tartrate (LOPRESSOR) 25 MG tablet 180 tablet 3     Sig: Take 1 tablet by mouth Every 12 (Twelve) Hours.      Last office visit with prescribing clinician: 1/18/2023   Last telemedicine visit with prescribing clinician: 7/24/2023   Next office visit with prescribing clinician: 7/24/2023                         Would you like a call back once the refill request has been completed: [] Yes [] No    If the office needs to give you a call back, can they leave a voicemail: [] Yes [] No    Stevan Hong MA  01/26/23, 08:38 EST

## 2023-02-17 ENCOUNTER — TELEPHONE (OUTPATIENT)
Dept: CARDIOLOGY | Facility: CLINIC | Age: 88
End: 2023-02-17
Payer: MEDICARE

## 2023-02-17 NOTE — TELEPHONE ENCOUNTER
Patients' son, Jerry has questions pertaining to his mothers heart cath from 2021.    Call back number [phone]

## 2023-05-02 NOTE — TELEPHONE ENCOUNTER
Rx Refill Note  Requested Prescriptions     Pending Prescriptions Disp Refills   • metoprolol tartrate (LOPRESSOR) 25 MG tablet [Pharmacy Med Name: METOPROLOL TARTRATE 25 MG TAB] 180 tablet 3     Sig: TAKE 1 TABLET BY MOUTH EVERY 12 HOURS      Last office visit with prescribing clinician: 1/18/2023   Last telemedicine visit with prescribing clinician: 7/24/2023   Next office visit with prescribing clinician: 7/24/2023                         Would you like a call back once the refill request has been completed: [] Yes [] No    If the office needs to give you a call back, can they leave a voicemail: [] Yes [] No    Flakita Joiner MA  05/02/23, 10:22 EDT

## 2023-07-24 ENCOUNTER — OFFICE VISIT (OUTPATIENT)
Dept: CARDIOLOGY | Facility: CLINIC | Age: 88
End: 2023-07-24
Payer: MEDICARE

## 2023-07-24 VITALS
HEART RATE: 44 BPM | HEIGHT: 64 IN | SYSTOLIC BLOOD PRESSURE: 138 MMHG | BODY MASS INDEX: 37.05 KG/M2 | DIASTOLIC BLOOD PRESSURE: 67 MMHG | WEIGHT: 217 LBS | OXYGEN SATURATION: 96 %

## 2023-07-24 DIAGNOSIS — I50.22 CHRONIC SYSTOLIC CONGESTIVE HEART FAILURE: ICD-10-CM

## 2023-07-24 DIAGNOSIS — E78.00 PURE HYPERCHOLESTEROLEMIA: ICD-10-CM

## 2023-07-24 DIAGNOSIS — I10 ESSENTIAL HYPERTENSION: ICD-10-CM

## 2023-07-24 DIAGNOSIS — I25.10 CORONARY ARTERY DISEASE INVOLVING NATIVE CORONARY ARTERY OF NATIVE HEART WITHOUT ANGINA PECTORIS: Primary | ICD-10-CM

## 2023-07-24 PROCEDURE — 1160F RVW MEDS BY RX/DR IN RCRD: CPT | Performed by: INTERNAL MEDICINE

## 2023-07-24 PROCEDURE — 1159F MED LIST DOCD IN RCRD: CPT | Performed by: INTERNAL MEDICINE

## 2023-07-24 PROCEDURE — 99214 OFFICE O/P EST MOD 30 MIN: CPT | Performed by: INTERNAL MEDICINE

## 2023-07-24 NOTE — PROGRESS NOTES
"    Subjective:     Encounter Date:07/24/2023      Patient ID: Suzette Blandon is a 88 y.o. female.    Chief Complaint:  History of Present Illness 88-year-old white female with history of coronary disease hypertension hyperlipidemia and history of congestive heart failure presents to my office for a follow-up and is currently stable without isms of chest pain but has some shortness of with exertion but no complains any PND orthopnea.  No palpitation but has some dizziness.  No syncope or swelling of the feet.  She is taking her medicines regular.  She does not smoke.    The following portions of the patient's history were reviewed and updated as appropriate: allergies, current medications, past family history, past medical history, past social history, past surgical history, and problem list.  Past Medical History:   Diagnosis Date    Arthritis     Coronary artery disease     Elevated cholesterol     GERD (gastroesophageal reflux disease)     Hypertension      Past Surgical History:   Procedure Laterality Date    ABDOMINAL SURGERY      CARDIAC CATHETERIZATION      CARDIAC CATHETERIZATION N/A 6/19/2021    Procedure: Left Heart Cath;  Surgeon: Bennie Pace MD;  Location: [facility] CATH INVASIVE LOCATION;  Service: Cardiology;  Laterality: N/A;    CARDIAC CATHETERIZATION N/A 6/19/2021    Procedure: Left ventriculography;  Surgeon: Bennie Pace MD;  Location: [facility] CATH INVASIVE LOCATION;  Service: Cardiology;  Laterality: N/A;    CARDIAC CATHETERIZATION N/A 6/19/2021    Procedure: Coronary angiography;  Surgeon: Bennie Pace MD;  Location: [facility] CATH INVASIVE LOCATION;  Service: Cardiology;  Laterality: N/A;    EYE SURGERY      CATARACT SURGERY    HYSTERECTOMY      JOINT REPLACEMENT      RIGHT HIP     /67 Comment: recheck  Pulse (!) 44   Ht 162.6 cm (64\")   Wt 98.4 kg (217 lb)   SpO2 96%   BMI 37.25 kg/m²   Family History   Problem Relation Age of Onset    Heart disease Mother     COPD Father     Heart " disease Father     Hypertension Father        Current Outpatient Medications:     aspirin 81 MG chewable tablet, Chew 1 tablet Daily., Disp: , Rfl:     furosemide (LASIX) 20 MG tablet, Take 1 tablet by mouth 2 (Two) Times a Day., Disp: 60 tablet, Rfl: 0    lisinopril (PRINIVIL,ZESTRIL) 20 MG tablet, Take 1 tablet by mouth 2 (Two) Times a Day., Disp: 180 tablet, Rfl: 3    metoprolol tartrate (LOPRESSOR) 25 MG tablet, TAKE 1 TABLET BY MOUTH EVERY 12 HOURS, Disp: 180 tablet, Rfl: 3    Misc Natural Products (Osteo Bi-Flex Joint Shield) tablet, Take 1 tablet by mouth 2 (two) times a day. Osteo Bi-Flex with Turmeric, Disp: , Rfl:     multivitamin with minerals tablet tablet, Take 1 tablet by mouth Daily., Disp: , Rfl:     nitroglycerin (NITROSTAT) 0.4 MG SL tablet, PLACE 1 TABLET UNDER THE TONGUE EVERY 5 (FIVE) MINUTES AS NEEDED FOR CHEST PAIN FOR UP TO 3 DOSES IN 15 MINUTES., Disp: 25 tablet, Rfl: 0    oxybutynin (DITROPAN) 5 MG tablet, Take 1 tablet by mouth 2 (Two) Times a Day., Disp: , Rfl:     potassium chloride 10 MEQ CR tablet, Take 1 tablet by mouth Daily., Disp: 30 tablet, Rfl: 0  Allergies   Allergen Reactions    Ether Unknown - High Severity    Oxycodone Unknown - Low Severity    Penicillins Unknown - Low Severity    Polyoxyethylene Lauryl Ether [Sorbitan] Unknown - High Severity    Statins Unknown - High Severity     Social History     Socioeconomic History    Marital status:    Tobacco Use    Smoking status: Former     Passive exposure: Past    Smokeless tobacco: Never   Vaping Use    Vaping Use: Never used   Substance and Sexual Activity    Alcohol use: Never    Drug use: Never    Sexual activity: Never     Review of Systems   Constitutional: Positive for malaise/fatigue.   Cardiovascular:  Negative for chest pain, dyspnea on exertion, leg swelling and palpitations.   Respiratory:  Positive for shortness of breath. Negative for cough.    Gastrointestinal:  Negative for abdominal pain, nausea and  vomiting.   Neurological:  Positive for dizziness and numbness. Negative for focal weakness, headaches and light-headedness.   All other systems reviewed and are negative.           Objective:     Constitutional:       Appearance: Well-developed.   Eyes:      General: No scleral icterus.     Conjunctiva/sclera: Conjunctivae normal.   HENT:      Head: Normocephalic and atraumatic.   Neck:      Vascular: No carotid bruit or JVD.   Pulmonary:      Effort: Pulmonary effort is normal.      Breath sounds: Normal breath sounds. No wheezing. No rales.   Cardiovascular:      Normal rate. Regular rhythm.   Pulses:     Intact distal pulses.   Abdominal:      General: Bowel sounds are normal.      Palpations: Abdomen is soft.   Musculoskeletal:      Cervical back: Normal range of motion and neck supple. Skin:     General: Skin is warm and dry.      Findings: No rash.   Neurological:      Mental Status: Alert.     Procedures    Lab Review:         MDM  #1 coronary disease  Patient has nonobstructive disease in the past and is currently stable on medications with normal function  2.  Hypertension  Patient blood pressure currently stable on metoprolol and lisinopril  3.  Hyperlipidemia  Patient on over-the-counter medicines and followed by primary care doctor  4 congestive heart failure  Patient had mild LV systolic dysfunction with an EF of 40 to 40% but with medicines that EF is improved to 55%.      Patient's previous medical records, labs, and EKG were reviewed and discussed with the patient at today's visit.

## 2023-12-05 RX ORDER — LISINOPRIL 20 MG/1
20 TABLET ORAL 2 TIMES DAILY
Qty: 180 TABLET | Refills: 3 | Status: SHIPPED | OUTPATIENT
Start: 2023-12-05

## 2023-12-05 NOTE — TELEPHONE ENCOUNTER
Rx Refill Note  Requested Prescriptions     Pending Prescriptions Disp Refills    lisinopril (PRINIVIL,ZESTRIL) 20 MG tablet [Pharmacy Med Name: LISINOPRIL 20 MG TABLET] 180 tablet 3     Sig: TAKE 1 TABLET BY MOUTH TWICE A DAY      Last office visit with prescribing clinician: 7/24/2023   Last telemedicine visit with prescribing clinician: Visit date not found   Next office visit with prescribing clinician: Visit date not found                         Would you like a call back once the refill request has been completed: [] Yes [] No    If the office needs to give you a call back, can they leave a voicemail: [] Yes [] No    Enriqueta Brown MA  12/05/23, 07:56 EST

## (undated) DEVICE — GW DIAG EMERALD HEPCOAT MOVE JTIP STD .035 3MM 150CM

## (undated) DEVICE — PINNACLE INTRODUCER SHEATH: Brand: PINNACLE

## (undated) DEVICE — CATH DIAG IMPULSE PIG .056 6F 110CM

## (undated) DEVICE — ELECTRD DEFIB M/FUNC PROPADZ RADIOL 2PK

## (undated) DEVICE — CATH DIAG IMPULSE FL4 6F 100CM

## (undated) DEVICE — CATH DIAG IMPULSE FR4 6F 100CM

## (undated) DEVICE — PK TRY HEART CATH 50